# Patient Record
Sex: MALE | Race: BLACK OR AFRICAN AMERICAN | Employment: OTHER | URBAN - METROPOLITAN AREA
[De-identification: names, ages, dates, MRNs, and addresses within clinical notes are randomized per-mention and may not be internally consistent; named-entity substitution may affect disease eponyms.]

---

## 2019-03-08 ENCOUNTER — APPOINTMENT (OUTPATIENT)
Dept: CT IMAGING | Age: 74
DRG: 683 | End: 2019-03-08
Attending: EMERGENCY MEDICINE
Payer: MEDICARE

## 2019-03-08 ENCOUNTER — HOSPITAL ENCOUNTER (INPATIENT)
Age: 74
LOS: 2 days | Discharge: HOME | DRG: 683 | End: 2019-03-10
Attending: EMERGENCY MEDICINE | Admitting: HOSPITALIST
Payer: MEDICARE

## 2019-03-08 ENCOUNTER — APPOINTMENT (OUTPATIENT)
Dept: VASCULAR SURGERY | Age: 74
DRG: 683 | End: 2019-03-08
Attending: EMERGENCY MEDICINE
Payer: MEDICARE

## 2019-03-08 ENCOUNTER — APPOINTMENT (OUTPATIENT)
Dept: GENERAL RADIOLOGY | Age: 74
DRG: 683 | End: 2019-03-08
Attending: EMERGENCY MEDICINE
Payer: MEDICARE

## 2019-03-08 DIAGNOSIS — R33.9 URINARY RETENTION: ICD-10-CM

## 2019-03-08 DIAGNOSIS — R53.1 GENERAL WEAKNESS: ICD-10-CM

## 2019-03-08 DIAGNOSIS — E83.42 HYPOMAGNESEMIA: ICD-10-CM

## 2019-03-08 DIAGNOSIS — I44.7 LBBB (LEFT BUNDLE BRANCH BLOCK): ICD-10-CM

## 2019-03-08 DIAGNOSIS — E87.1 HYPONATREMIA: Primary | ICD-10-CM

## 2019-03-08 DIAGNOSIS — N17.9 ACUTE RENAL FAILURE, UNSPECIFIED ACUTE RENAL FAILURE TYPE (HCC): ICD-10-CM

## 2019-03-08 PROBLEM — E78.5 HYPERLIPIDEMIA: Status: ACTIVE | Noted: 2019-03-08

## 2019-03-08 PROBLEM — I10 HTN (HYPERTENSION): Status: ACTIVE | Noted: 2019-03-08

## 2019-03-08 PROBLEM — G47.33 OSA (OBSTRUCTIVE SLEEP APNEA): Status: ACTIVE | Noted: 2019-03-08

## 2019-03-08 PROBLEM — E66.01 MORBID OBESITY (HCC): Status: ACTIVE | Noted: 2019-03-08

## 2019-03-08 PROBLEM — E11.9 TYPE 2 DIABETES MELLITUS (HCC): Status: ACTIVE | Noted: 2019-03-08

## 2019-03-08 LAB
ALBUMIN SERPL-MCNC: 3.2 G/DL (ref 3.4–5)
ALBUMIN/GLOB SERPL: 0.8 {RATIO} (ref 0.8–1.7)
ALP SERPL-CCNC: 113 U/L (ref 45–117)
ALT SERPL-CCNC: 24 U/L (ref 16–61)
ANION GAP SERPL CALC-SCNC: 11 MMOL/L (ref 3–18)
ANION GAP SERPL CALC-SCNC: 9 MMOL/L (ref 3–18)
APPEARANCE UR: CLEAR
APTT PPP: 33.8 SEC (ref 23–36.4)
AST SERPL-CCNC: 29 U/L (ref 15–37)
BACTERIA URNS QL MICRO: NEGATIVE /HPF
BILIRUB SERPL-MCNC: 0.4 MG/DL (ref 0.2–1)
BILIRUB UR QL: NEGATIVE
BNP SERPL-MCNC: 642 PG/ML (ref 0–900)
BUN SERPL-MCNC: 62 MG/DL (ref 7–18)
BUN SERPL-MCNC: 64 MG/DL (ref 7–18)
BUN/CREAT SERPL: 24 (ref 12–20)
BUN/CREAT SERPL: 28 (ref 12–20)
CALCIUM SERPL-MCNC: 7.7 MG/DL (ref 8.5–10.1)
CALCIUM SERPL-MCNC: 7.7 MG/DL (ref 8.5–10.1)
CHLORIDE SERPL-SCNC: 87 MMOL/L (ref 100–108)
CHLORIDE SERPL-SCNC: 89 MMOL/L (ref 100–108)
CK MB CFR SERPL CALC: 2.1 % (ref 0–4)
CK MB SERPL-MCNC: 10.9 NG/ML (ref 5–25)
CK SERPL-CCNC: 526 U/L (ref 39–308)
CO2 SERPL-SCNC: 20 MMOL/L (ref 21–32)
CO2 SERPL-SCNC: 23 MMOL/L (ref 21–32)
COLOR UR: YELLOW
CREAT SERPL-MCNC: 2.3 MG/DL (ref 0.6–1.3)
CREAT SERPL-MCNC: 2.56 MG/DL (ref 0.6–1.3)
EPITH CASTS URNS QL MICRO: NEGATIVE /LPF (ref 0–5)
ERYTHROCYTE [DISTWIDTH] IN BLOOD BY AUTOMATED COUNT: 13.8 % (ref 11.6–14.5)
GLOBULIN SER CALC-MCNC: 4 G/DL (ref 2–4)
GLUCOSE BLD STRIP.AUTO-MCNC: 67 MG/DL (ref 70–110)
GLUCOSE SERPL-MCNC: 124 MG/DL (ref 74–99)
GLUCOSE SERPL-MCNC: 57 MG/DL (ref 74–99)
GLUCOSE UR STRIP.AUTO-MCNC: NEGATIVE MG/DL
HCT VFR BLD AUTO: 38.8 % (ref 36–48)
HGB BLD-MCNC: 13 G/DL (ref 13–16)
HGB UR QL STRIP: ABNORMAL
INR PPP: 1 (ref 0.8–1.2)
KETONES UR QL STRIP.AUTO: NEGATIVE MG/DL
LEUKOCYTE ESTERASE UR QL STRIP.AUTO: NEGATIVE
MAGNESIUM SERPL-MCNC: 1.3 MG/DL (ref 1.6–2.6)
MCH RBC QN AUTO: 26.6 PG (ref 24–34)
MCHC RBC AUTO-ENTMCNC: 33.5 G/DL (ref 31–37)
MCV RBC AUTO: 79.3 FL (ref 74–97)
NITRITE UR QL STRIP.AUTO: NEGATIVE
PH UR STRIP: 5 [PH] (ref 5–8)
PLATELET # BLD AUTO: 221 K/UL (ref 135–420)
PMV BLD AUTO: 10.4 FL (ref 9.2–11.8)
POTASSIUM SERPL-SCNC: 4.9 MMOL/L (ref 3.5–5.5)
POTASSIUM SERPL-SCNC: 5.3 MMOL/L (ref 3.5–5.5)
PROT SERPL-MCNC: 7.2 G/DL (ref 6.4–8.2)
PROT UR STRIP-MCNC: 100 MG/DL
PROTHROMBIN TIME: 12.9 SEC (ref 11.5–15.2)
PSA SERPL-MCNC: 10.9 NG/ML (ref 0–4)
RBC # BLD AUTO: 4.89 M/UL (ref 4.7–5.5)
RBC #/AREA URNS HPF: NORMAL /HPF (ref 0–5)
SODIUM SERPL-SCNC: 118 MMOL/L (ref 136–145)
SODIUM SERPL-SCNC: 121 MMOL/L (ref 136–145)
SP GR UR REFRACTOMETRY: 1.01 (ref 1–1.03)
TROPONIN I SERPL-MCNC: 0.04 NG/ML (ref 0–0.04)
UROBILINOGEN UR QL STRIP.AUTO: 0.2 EU/DL (ref 0.2–1)
WBC # BLD AUTO: 8.9 K/UL (ref 4.6–13.2)
WBC URNS QL MICRO: NORMAL /HPF (ref 0–4)

## 2019-03-08 PROCEDURE — 82962 GLUCOSE BLOOD TEST: CPT

## 2019-03-08 PROCEDURE — 93971 EXTREMITY STUDY: CPT

## 2019-03-08 PROCEDURE — 83880 ASSAY OF NATRIURETIC PEPTIDE: CPT

## 2019-03-08 PROCEDURE — 87086 URINE CULTURE/COLONY COUNT: CPT

## 2019-03-08 PROCEDURE — 74011250637 HC RX REV CODE- 250/637: Performed by: EMERGENCY MEDICINE

## 2019-03-08 PROCEDURE — 96374 THER/PROPH/DIAG INJ IV PUSH: CPT

## 2019-03-08 PROCEDURE — 51702 INSERT TEMP BLADDER CATH: CPT

## 2019-03-08 PROCEDURE — 83735 ASSAY OF MAGNESIUM: CPT

## 2019-03-08 PROCEDURE — 85730 THROMBOPLASTIN TIME PARTIAL: CPT

## 2019-03-08 PROCEDURE — 93005 ELECTROCARDIOGRAM TRACING: CPT

## 2019-03-08 PROCEDURE — 82550 ASSAY OF CK (CPK): CPT

## 2019-03-08 PROCEDURE — 81001 URINALYSIS AUTO W/SCOPE: CPT

## 2019-03-08 PROCEDURE — 71045 X-RAY EXAM CHEST 1 VIEW: CPT

## 2019-03-08 PROCEDURE — 74011250636 HC RX REV CODE- 250/636: Performed by: EMERGENCY MEDICINE

## 2019-03-08 PROCEDURE — 74011250637 HC RX REV CODE- 250/637: Performed by: HOSPITALIST

## 2019-03-08 PROCEDURE — 99285 EMERGENCY DEPT VISIT HI MDM: CPT

## 2019-03-08 PROCEDURE — 77030005514 HC CATH URETH FOL14 BARD -A

## 2019-03-08 PROCEDURE — 94660 CPAP INITIATION&MGMT: CPT

## 2019-03-08 PROCEDURE — 85027 COMPLETE CBC AUTOMATED: CPT

## 2019-03-08 PROCEDURE — 84153 ASSAY OF PSA TOTAL: CPT

## 2019-03-08 PROCEDURE — 74011250636 HC RX REV CODE- 250/636: Performed by: HOSPITALIST

## 2019-03-08 PROCEDURE — 80053 COMPREHEN METABOLIC PANEL: CPT

## 2019-03-08 PROCEDURE — 85610 PROTHROMBIN TIME: CPT

## 2019-03-08 PROCEDURE — 36415 COLL VENOUS BLD VENIPUNCTURE: CPT

## 2019-03-08 PROCEDURE — 65270000029 HC RM PRIVATE

## 2019-03-08 PROCEDURE — 74176 CT ABD & PELVIS W/O CONTRAST: CPT

## 2019-03-08 RX ORDER — ACETAMINOPHEN 325 MG/1
650 TABLET ORAL
Status: DISCONTINUED | OUTPATIENT
Start: 2019-03-08 | End: 2019-03-09

## 2019-03-08 RX ORDER — OMEPRAZOLE 20 MG/1
20 CAPSULE, DELAYED RELEASE ORAL DAILY
COMMUNITY

## 2019-03-08 RX ORDER — GLUCOSAMINE SULFATE 1500 MG
1000 POWDER IN PACKET (EA) ORAL DAILY
COMMUNITY
End: 2019-04-30

## 2019-03-08 RX ORDER — CALCIPOTRIENE 50 UG/G
CREAM TOPICAL 2 TIMES DAILY
COMMUNITY

## 2019-03-08 RX ORDER — ATORVASTATIN CALCIUM 20 MG/1
10 TABLET, FILM COATED ORAL
Status: DISCONTINUED | OUTPATIENT
Start: 2019-03-09 | End: 2019-03-10 | Stop reason: HOSPADM

## 2019-03-08 RX ORDER — FUROSEMIDE 10 MG/ML
40 INJECTION INTRAMUSCULAR; INTRAVENOUS
Status: COMPLETED | OUTPATIENT
Start: 2019-03-08 | End: 2019-03-08

## 2019-03-08 RX ORDER — DORZOLAMIDE HYDROCHLORIDE AND TIMOLOL MALEATE 20; 5 MG/ML; MG/ML
1 SOLUTION/ DROPS OPHTHALMIC 2 TIMES DAILY
COMMUNITY

## 2019-03-08 RX ORDER — LATANOPROST 50 UG/ML
1 SOLUTION/ DROPS OPHTHALMIC
COMMUNITY
End: 2019-04-30

## 2019-03-08 RX ORDER — NIFEDIPINE 60 MG/1
60 TABLET, EXTENDED RELEASE ORAL DAILY
COMMUNITY

## 2019-03-08 RX ORDER — LISINOPRIL 40 MG/1
40 TABLET ORAL DAILY
COMMUNITY
End: 2019-04-30

## 2019-03-08 RX ORDER — DOCUSATE SODIUM 100 MG/1
100 CAPSULE, LIQUID FILLED ORAL
COMMUNITY

## 2019-03-08 RX ORDER — LANOLIN ALCOHOL/MO/W.PET/CERES
420 CREAM (GRAM) TOPICAL DAILY
COMMUNITY

## 2019-03-08 RX ORDER — ATORVASTATIN CALCIUM 20 MG/1
20 TABLET, FILM COATED ORAL
Status: DISCONTINUED | OUTPATIENT
Start: 2019-03-08 | End: 2019-03-08

## 2019-03-08 RX ORDER — ATORVASTATIN CALCIUM 20 MG/1
20 TABLET, FILM COATED ORAL
COMMUNITY
End: 2019-04-30

## 2019-03-08 RX ORDER — GLYBURIDE 5 MG/1
5 TABLET ORAL
COMMUNITY

## 2019-03-08 RX ORDER — ASPIRIN 325 MG
325 TABLET ORAL DAILY
COMMUNITY
End: 2019-04-30

## 2019-03-08 RX ORDER — DOCUSATE SODIUM 100 MG/1
100 CAPSULE, LIQUID FILLED ORAL
Status: DISCONTINUED | OUTPATIENT
Start: 2019-03-08 | End: 2019-03-10 | Stop reason: HOSPADM

## 2019-03-08 RX ORDER — COLCHICINE 0.6 MG/1
0.6 TABLET ORAL AS NEEDED
COMMUNITY
End: 2019-04-30

## 2019-03-08 RX ORDER — ENOXAPARIN SODIUM 100 MG/ML
40 INJECTION SUBCUTANEOUS EVERY 24 HOURS
Status: DISCONTINUED | OUTPATIENT
Start: 2019-03-08 | End: 2019-03-09

## 2019-03-08 RX ORDER — MAGNESIUM SULFATE HEPTAHYDRATE 40 MG/ML
2 INJECTION, SOLUTION INTRAVENOUS
Status: COMPLETED | OUTPATIENT
Start: 2019-03-08 | End: 2019-03-09

## 2019-03-08 RX ORDER — CHLORTHALIDONE 25 MG/1
25 TABLET ORAL DAILY
COMMUNITY

## 2019-03-08 RX ADMIN — MAGNESIUM SULFATE HEPTAHYDRATE 2 G: 40 INJECTION, SOLUTION INTRAVENOUS at 17:14

## 2019-03-08 RX ADMIN — ENOXAPARIN SODIUM 40 MG: 40 INJECTION SUBCUTANEOUS at 18:35

## 2019-03-08 RX ADMIN — FUROSEMIDE 40 MG: 10 INJECTION, SOLUTION INTRAMUSCULAR; INTRAVENOUS at 13:47

## 2019-03-08 RX ADMIN — NITROGLYCERIN 1 INCH: 20 OINTMENT TOPICAL at 13:47

## 2019-03-08 NOTE — ED NOTES
TRANSFER - ED to INPATIENT REPORT:    SBAR report made available to receiving floor on this patient being transferred to [facility] (2100)  for routine progression of care       Admitting diagnosis Urinary retention [R33.9]  Acute renal failure (ARF) ([facility] [address] [N17.9]    Information from the following report(s) SBAR, ED Summary and MAR was made available to receiving floor.     Lines:   Peripheral IV 03/08/19 Right Antecubital (Active)   Site Assessment Clean, dry, & intact 3/8/2019  1:21 PM   Phlebitis Assessment 0 3/8/2019  1:21 PM   Infiltration Assessment 0 3/8/2019  1:21 PM   Dressing Status Clean, dry, & intact 3/8/2019  1:21 PM   Dressing Type Transparent 3/8/2019  1:21 PM   Hub Color/Line Status Green;Flushed 3/8/2019  1:21 PM   Action Taken Blood drawn 3/8/2019  1:21 PM          Patient is oriented to time, place, person and situation   Patient is  continent and      Valuables transported with patient     Patient transported with:       MAP (Monitor): 74 =Monitored (most recent)  Vitals w/ MEWS Score (last day)     Date/Time MEWS Score Pulse Resp Temp BP Level of Consciousness SpO2    03/08/19 1645  2  80  21  98.2 °F (36.8 °C)  131/58  Alert  93 %    03/08/19 1530  --  75  --  --  102/53  --  95 %    03/08/19 1515  --  77  --  --  129/44  --  94 %    03/08/19 1500  --  76  --  --  130/61  --  95 %    03/08/19 1445  --  76  --  --  130/67  --  94 %    03/08/19 1430  --  67  --  --  132/56  --  95 %    03/08/19 1415  --  69  --  --  --  --  --    03/08/19 1400  --  68  --  --  124/71  --  --    03/08/19 1347  --  66  --  --  123/60  --  --    03/08/19 1345  --  66  --  --  --  --  96 %    03/08/19 1330  --  64  --  --  --  --  96 %    03/08/19 1315  --  61  --  --  --  --  97 %    03/08/19 1300  --  62  --  --  123/60  --  95 %    03/08/19 12:51:38  0  58  (Abnormal)   14  98.4 °F (36.9 °C)  148/58  Alert  98 %    03/08/19 1243  --  --  18  98.7 °F (37.1 °C)  --  Alert  --

## 2019-03-08 NOTE — ED TRIAGE NOTES
Patient brought to the ED via EMS, abd pain x 3 days with blood in stool, denies N/V/D, states he has had difficulty urinating and bilateral leg swelling, denies CHF

## 2019-03-08 NOTE — ED PROVIDER NOTES
EMERGENCY DEPARTMENT HISTORY AND PHYSICAL EXAM    1:13 PM      Date: 3/8/2019  Patient Name: Alicia Paris    History of Presenting Illness     Chief Complaint   Patient presents with    Leg Swelling    Abdominal Pain    Melena       History Provided By: Patient      Additional History (Context): Alicia Paris is a 76 y.o. male with diabetes who presents with worsening leg swelling for the past 3 days. Denies NVD. Normally goes to [place]. No recent travel, hx of DVT, or recent surgeries. He says this leg swelling is never this bad. Reports for the last week that he cannot stand due to feet swelling. Reports wheezing, sob,  difficulty urinating, and blood in stool. gen weak  No cp or sob  + unable to urinate today        PCP: None    Chief Complaint: leg swelling  Duration: 3  Days  Timing:  Constant  Location: left leg  Severity: 6 out of 10  Associated Symptoms:, difficulty urinating, and blood in stool. Past History     Past Medical History:  DM    Past Surgical History:  No past surgical history on file. Family History:  No family history on file. Social History:  Social History     Tobacco Use    Smoking status: Not on file   Substance Use Topics    Alcohol use: Not on file    Drug use: Not on file       Allergies:  No Known Allergies      Review of Systems       Review of Systems   Constitutional: Negative for activity change, fatigue and fever. HENT: Negative for congestion and rhinorrhea. Eyes: Negative for visual disturbance. Respiratory: Positive for shortness of breath and wheezing. Cardiovascular: Positive for leg swelling. Negative for chest pain and palpitations. Gastrointestinal: Positive for abdominal pain. Negative for diarrhea, nausea and vomiting. Genitourinary: Positive for difficulty urinating. Negative for dysuria and hematuria. Positive for Melena   Musculoskeletal: Negative for back pain. Skin: Negative for rash.    Neurological: Negative for dizziness, weakness and light-headedness. Psychiatric/Behavioral: Negative for agitation. All other systems reviewed and are negative. Physical Exam     Visit Vitals  /53   Pulse 75   Temp 98.4 °F (36.9 °C)   Resp 14   Ht 6' (1.829 m)   Wt 145.2 kg (320 lb)   SpO2 95%   BMI 43.40 kg/m²         Physical Exam   Constitutional: He appears well-developed and well-nourished. HENT:   Head: Normocephalic and atraumatic. Eyes: Conjunctivae are normal. Pupils are equal, round, and reactive to light. Neck: Normal range of motion. Neck supple. JVD: unable to eval for jvd. Cardiovascular: Normal rate and regular rhythm. No calf tenderness  dp pulse present   Pulmonary/Chest: Effort normal and breath sounds normal.   Abdominal: Soft. Bowel sounds are normal. There is no tenderness. Abd full but nontender   Genitourinary:   Genitourinary Comments: Rectal exam: chaperoned by Juliet Kilgore (med student)  Bright red blood per rectum that is internal. No hemorrhoids   Musculoskeletal: Normal range of motion. He exhibits edema. Left lower extremity more edema than right  No cellulitis or open wounds   Lymphadenopathy:     He has no cervical adenopathy. Neurological: He is alert. Skin: Skin is warm. Psychiatric: He has a normal mood and affect.          Diagnostic Study Results     Labs -  Recent Results (from the past 12 hour(s))   GLUCOSE, POC    Collection Time: 03/08/19 12:59 PM   Result Value Ref Range    Glucose (POC) 67 (L) 70 - 110 mg/dL   CARDIAC PANEL,(CK, CKMB & TROPONIN)    Collection Time: 03/08/19  1:15 PM   Result Value Ref Range     (H) 39 - 308 U/L    CK - MB 10.9 (H) <3.6 ng/ml    CK-MB Index 2.1 0.0 - 4.0 %    Troponin-I, QT 0.04 0.0 - 0.045 NG/ML   CBC W/O DIFF    Collection Time: 03/08/19  1:15 PM   Result Value Ref Range    WBC 8.9 4.6 - 13.2 K/uL    RBC 4.89 4.70 - 5.50 M/uL    HGB 13.0 13.0 - 16.0 g/dL    HCT 38.8 36.0 - 48.0 %    MCV 79.3 74.0 - 97.0 FL    MCH 26.6 24.0 - 34.0 PG    MCHC 33.5 31.0 - 37.0 g/dL    RDW 13.8 11.6 - 14.5 %    PLATELET 927 757 - 175 K/uL    MPV 10.4 9.2 - 70.0 FL   METABOLIC PANEL, COMPREHENSIVE    Collection Time: 03/08/19  1:15 PM   Result Value Ref Range    Sodium 118 (LL) 136 - 145 mmol/L    Potassium 5.3 3.5 - 5.5 mmol/L    Chloride 87 (L) 100 - 108 mmol/L    CO2 20 (L) 21 - 32 mmol/L    Anion gap 11 3.0 - 18 mmol/L    Glucose 57 (L) 74 - 99 mg/dL    BUN 62 (H) 7.0 - 18 MG/DL    Creatinine 2.56 (H) 0.6 - 1.3 MG/DL    BUN/Creatinine ratio 24 (H) 12 - 20      GFR est AA 30 (L) >60 ml/min/1.73m2    GFR est non-AA 25 (L) >60 ml/min/1.73m2    Calcium 7.7 (L) 8.5 - 10.1 MG/DL    Bilirubin, total 0.4 0.2 - 1.0 MG/DL    ALT (SGPT) 24 16 - 61 U/L    AST (SGOT) 29 15 - 37 U/L    Alk.  phosphatase 113 45 - 117 U/L    Protein, total 7.2 6.4 - 8.2 g/dL    Albumin 3.2 (L) 3.4 - 5.0 g/dL    Globulin 4.0 2.0 - 4.0 g/dL    A-G Ratio 0.8 0.8 - 1.7     NT-PRO BNP    Collection Time: 03/08/19  1:15 PM   Result Value Ref Range    NT pro- 0 - 900 PG/ML   PROTHROMBIN TIME + INR    Collection Time: 03/08/19  1:15 PM   Result Value Ref Range    Prothrombin time 12.9 11.5 - 15.2 sec    INR 1.0 0.8 - 1.2     PTT    Collection Time: 03/08/19  1:15 PM   Result Value Ref Range    aPTT 33.8 23.0 - 36.4 SEC   MAGNESIUM    Collection Time: 03/08/19  1:15 PM   Result Value Ref Range    Magnesium 1.3 (L) 1.6 - 2.6 mg/dL   EKG, 12 LEAD, INITIAL    Collection Time: 03/08/19  1:30 PM   Result Value Ref Range    Ventricular Rate 62 BPM    Atrial Rate 62 BPM    P-R Interval 168 ms    QRS Duration 148 ms    Q-T Interval 450 ms    QTC Calculation (Bezet) 456 ms    Calculated P Axis 32 degrees    Calculated R Axis 13 degrees    Calculated T Axis -158 degrees    Diagnosis       Normal sinus rhythm  Left bundle branch block  Abnormal ECG  No previous ECGs available     URINALYSIS W/ RFLX MICROSCOPIC    Collection Time: 03/08/19  3:11 PM   Result Value Ref Range    Color YELLOW      Appearance CLEAR Specific gravity 1.013 1.005 - 1.030      pH (UA) 5.0 5.0 - 8.0      Protein 100 (A) NEG mg/dL    Glucose NEGATIVE  NEG mg/dL    Ketone NEGATIVE  NEG mg/dL    Bilirubin NEGATIVE  NEG      Blood SMALL (A) NEG      Urobilinogen 0.2 0.2 - 1.0 EU/dL    Nitrites NEGATIVE  NEG      Leukocyte Esterase NEGATIVE  NEG         Radiologic Studies -   CT ABD PELV WO CONT   Final Result   IMPRESSION:      1. Normal caliber small and large bowel. No evidence of bowel obstruction or   free intraperitoneal gas. Colonic diverticulosis without findings of   diverticulitis      2. Mild bilateral perinephric stranding; nonspecific. No hydronephrosis or   urolithiasis. Urinary bladder decompressed with a Zhang catheter. 3. Inferior vena cava filter in situ, below the level of the renal veins. .         XR CHEST SNGL V   Final Result   IMPRESSION:      No acute pulmonary process identified. Prominent cardiac silhouette noted. Medical Decision Making     It should be noted that IKayy MD will be the provider of record for this patient. I reviewed the vital signs, available nursing notes, past medical history, past surgical history, family history and social history. Vital Signs-Reviewed the patient's vital signs. Pulse Oximetry Analysis -  95% on room air     EKG: Interpreted by the EP. Time Interpreted: 13:30   Rate: 62   Rhythm: Sinus Rhythm    Interpretation: normal intervals except  ms. There is a LBBB present. Records Reviewed: Nursing Notes (Time of Review: 1:13 PM)    Provider Notes (Medical Decision Making):  Patient presents to emergency department with rectal bleeding, hemorrhoid on physical exam no abdominal tenderness. The bleeding is right bright red blood. No black or bloody stools appreciated no masses in the abdominal exam no pulsatile mass.   Patient also has bilateral lower extremity edema left greater than right patient has increased edema and states he is unable to stand on his feet. Denies any shortness of breath or chest pain. Denies any fever chills vomiting diarrhea or cough. Patient also states he is unable to urinate and feels like he has to urinate but I cannot all day today. Patient does not have any significant medical history that can be appreciated from the prior chart as patient is a patient at [facility] and not much information is available patient does have left bundle branch block but unable to confirm this is her new from the EKGs. Patient does have medical history of diabetes patient's white ulcer today associated with blood pressure 148/58 there is no hypoxia or tachypnea appreciated. Hyponatremia is appreciated renal insufficiency is appreciated  Possible secondary to urinary retention  I will check urine for UTI as well  A Zhang catheter is placed as patient is unable to urinate in the emergency Department that is been present for 1 day. I will admit patient start antibiotics based on patient's urinalysis  Arrival slowly hydrate patient with sodium and evaluate patient periodically for changes in patient's sodium level. CXR shows no PNA. IVC filter on CT. Perinephric standing present. Urine pending. ED Course: Progress Notes, Reevaluation, and Consults: All findings discussed with patient at 1700  No complaints   Abdominal  Pressure is resolved  No complaints  No cp or sob  Zhang bag present due to unable to urinate  No rash        Critical Care Time:  The services I provided to this patient were to treat and/or prevent clinically significant deterioration that could result in the failure of one or more body systems and/or organ systems due to electrolyte insufficieny.     Services included the following:  -reviewing nursing notes and old charts  -vital sign assessments  -direct patient care  -medication orders and management  -interpreting and reviewing diagnostic studies/labs  -re-evaluations  -documentation time    Aggregate critical care time was 30 minutes, which includes only time during which I was engaged in work directly related to the patient's care as described above, whether I was at bedside or elsewhere in the Emergency Department. It did not include time spent performing other reported procedures or the services of residents, students, nurses, or advance practice providers. Rhina Castro MD    4:42 PM      For Hospitalized Patients:    1. Hospitalization Decision Time:  The decision to hospitalize the patient was made by Dr. Marquise Espinal at 1700 on 3/8/2019        Diagnosis     Clinical Impression:   1. Hyponatremia    2. General weakness    3. Acute renal failure, unspecified acute renal failure type ([facility] [address]    4. Urinary retention    5. Hypomagnesemia    6. LBBB (left bundle branch block)        Disposition: home    Follow-up Information    None             Medication List      You have not been prescribed any medications. _______________________________       Scribe Castro Casanova acting as a scribe for and in the presence of Rhina Castro MD      March 08, 2019 at 1:52 PM       Provider Attestation:      I personally performed the services described in the documentation, reviewed the documentation, as recorded by the scribe in my presence, and it accurately and completely records my words and actions.  March 08, 2019 at 1:52 PM - Rhina Castro MD        _______________________________

## 2019-03-08 NOTE — PROGRESS NOTES
Pt arrived to the floor from ED via stretcher to room accompanied by ED nurse. Pt is A&Ox4, denies pain. No distress noted. Pt oriented to room. Call bell and frequently used items in reach with bed locked in lowest position. Pt stated he uses a CiPap machine at night. MD made aware.

## 2019-03-08 NOTE — ED NOTES
Pt transported to 2103 for admission. Bedside report given to Harry CLEANING with all questions answered. Magnesium sulfate infusion continued on admission. Pt in no distress at time of admission.

## 2019-03-09 PROBLEM — E87.1 HYPONATREMIA: Status: ACTIVE | Noted: 2019-03-09

## 2019-03-09 LAB
ANION GAP SERPL CALC-SCNC: 9 MMOL/L (ref 3–18)
BASOPHILS # BLD: 0 K/UL (ref 0–0.1)
BASOPHILS NFR BLD: 0 % (ref 0–2)
BUN SERPL-MCNC: 64 MG/DL (ref 7–18)
BUN/CREAT SERPL: 31 (ref 12–20)
CALCIUM SERPL-MCNC: 7.7 MG/DL (ref 8.5–10.1)
CHLORIDE SERPL-SCNC: 91 MMOL/L (ref 100–108)
CO2 SERPL-SCNC: 22 MMOL/L (ref 21–32)
CREAT SERPL-MCNC: 2.08 MG/DL (ref 0.6–1.3)
DIFFERENTIAL METHOD BLD: ABNORMAL
EOSINOPHIL # BLD: 0.4 K/UL (ref 0–0.4)
EOSINOPHIL NFR BLD: 6 % (ref 0–5)
ERYTHROCYTE [DISTWIDTH] IN BLOOD BY AUTOMATED COUNT: 13.7 % (ref 11.6–14.5)
EST. AVERAGE GLUCOSE BLD GHB EST-MCNC: 123 MG/DL
GLUCOSE BLD STRIP.AUTO-MCNC: 122 MG/DL (ref 70–110)
GLUCOSE BLD STRIP.AUTO-MCNC: 129 MG/DL (ref 70–110)
GLUCOSE BLD STRIP.AUTO-MCNC: 133 MG/DL (ref 70–110)
GLUCOSE BLD STRIP.AUTO-MCNC: 143 MG/DL (ref 70–110)
GLUCOSE BLD STRIP.AUTO-MCNC: 83 MG/DL (ref 70–110)
GLUCOSE SERPL-MCNC: 83 MG/DL (ref 74–99)
HBA1C MFR BLD: 5.9 % (ref 4.2–5.6)
HCT VFR BLD AUTO: 35.1 % (ref 36–48)
HCT VFR BLD AUTO: 35.8 % (ref 36–48)
HGB BLD-MCNC: 11.6 G/DL (ref 13–16)
HGB BLD-MCNC: 11.7 G/DL (ref 13–16)
LYMPHOCYTES # BLD: 1.5 K/UL (ref 0.9–3.6)
LYMPHOCYTES NFR BLD: 23 % (ref 21–52)
MCH RBC QN AUTO: 25.8 PG (ref 24–34)
MCHC RBC AUTO-ENTMCNC: 32.7 G/DL (ref 31–37)
MCV RBC AUTO: 78.9 FL (ref 74–97)
MONOCYTES # BLD: 0.8 K/UL (ref 0.05–1.2)
MONOCYTES NFR BLD: 12 % (ref 3–10)
NEUTS SEG # BLD: 3.9 K/UL (ref 1.8–8)
NEUTS SEG NFR BLD: 59 % (ref 40–73)
PLATELET # BLD AUTO: 176 K/UL (ref 135–420)
PMV BLD AUTO: 10.2 FL (ref 9.2–11.8)
POTASSIUM SERPL-SCNC: 5 MMOL/L (ref 3.5–5.5)
RBC # BLD AUTO: 4.54 M/UL (ref 4.7–5.5)
SODIUM SERPL-SCNC: 122 MMOL/L (ref 136–145)
WBC # BLD AUTO: 6.6 K/UL (ref 4.6–13.2)

## 2019-03-09 PROCEDURE — 94660 CPAP INITIATION&MGMT: CPT

## 2019-03-09 PROCEDURE — 74011000250 HC RX REV CODE- 250: Performed by: HOSPITALIST

## 2019-03-09 PROCEDURE — 83036 HEMOGLOBIN GLYCOSYLATED A1C: CPT

## 2019-03-09 PROCEDURE — 74011250637 HC RX REV CODE- 250/637: Performed by: HOSPITALIST

## 2019-03-09 PROCEDURE — 36415 COLL VENOUS BLD VENIPUNCTURE: CPT

## 2019-03-09 PROCEDURE — 74011250636 HC RX REV CODE- 250/636: Performed by: PHYSICIAN ASSISTANT

## 2019-03-09 PROCEDURE — 85025 COMPLETE CBC W/AUTO DIFF WBC: CPT

## 2019-03-09 PROCEDURE — 85018 HEMOGLOBIN: CPT

## 2019-03-09 PROCEDURE — 77030020186 HC BOOT HL PROTCT SAGE -B

## 2019-03-09 PROCEDURE — 80048 BASIC METABOLIC PNL TOTAL CA: CPT

## 2019-03-09 PROCEDURE — 65270000029 HC RM PRIVATE

## 2019-03-09 PROCEDURE — 74011250637 HC RX REV CODE- 250/637: Performed by: PHYSICIAN ASSISTANT

## 2019-03-09 RX ORDER — LATANOPROST 50 UG/ML
1 SOLUTION/ DROPS OPHTHALMIC
Status: DISCONTINUED | OUTPATIENT
Start: 2019-03-09 | End: 2019-03-10 | Stop reason: HOSPADM

## 2019-03-09 RX ORDER — ALLOPURINOL 100 MG/1
100 TABLET ORAL DAILY
Status: DISCONTINUED | OUTPATIENT
Start: 2019-03-09 | End: 2019-03-10 | Stop reason: HOSPADM

## 2019-03-09 RX ORDER — INSULIN LISPRO 100 [IU]/ML
INJECTION, SOLUTION INTRAVENOUS; SUBCUTANEOUS
Status: DISCONTINUED | OUTPATIENT
Start: 2019-03-09 | End: 2019-03-09

## 2019-03-09 RX ORDER — ACETAMINOPHEN 325 MG/1
975 TABLET ORAL 3 TIMES DAILY
Status: DISCONTINUED | OUTPATIENT
Start: 2019-03-09 | End: 2019-03-10 | Stop reason: HOSPADM

## 2019-03-09 RX ORDER — ACETAMINOPHEN 325 MG/1
975 TABLET ORAL 3 TIMES DAILY
COMMUNITY

## 2019-03-09 RX ORDER — PANTOPRAZOLE SODIUM 40 MG/1
40 TABLET, DELAYED RELEASE ORAL
Status: DISCONTINUED | OUTPATIENT
Start: 2019-03-09 | End: 2019-03-10 | Stop reason: HOSPADM

## 2019-03-09 RX ORDER — INSULIN LISPRO 100 [IU]/ML
INJECTION, SOLUTION INTRAVENOUS; SUBCUTANEOUS
Status: DISCONTINUED | OUTPATIENT
Start: 2019-03-09 | End: 2019-03-10 | Stop reason: HOSPADM

## 2019-03-09 RX ORDER — NIFEDIPINE 30 MG/1
60 TABLET, EXTENDED RELEASE ORAL DAILY
Status: DISCONTINUED | OUTPATIENT
Start: 2019-03-10 | End: 2019-03-10 | Stop reason: HOSPADM

## 2019-03-09 RX ORDER — DORZOLAMIDE HYDROCHLORIDE AND TIMOLOL MALEATE 20; 5 MG/ML; MG/ML
1 SOLUTION/ DROPS OPHTHALMIC 2 TIMES DAILY
Status: DISCONTINUED | OUTPATIENT
Start: 2019-03-09 | End: 2019-03-10 | Stop reason: HOSPADM

## 2019-03-09 RX ORDER — ASPIRIN 325 MG
325 TABLET ORAL DAILY
Status: DISCONTINUED | OUTPATIENT
Start: 2019-03-09 | End: 2019-03-10 | Stop reason: HOSPADM

## 2019-03-09 RX ORDER — ALLOPURINOL 100 MG/1
100 TABLET ORAL DAILY
COMMUNITY
End: 2019-04-30

## 2019-03-09 RX ORDER — SODIUM CHLORIDE 9 MG/ML
100 INJECTION, SOLUTION INTRAVENOUS CONTINUOUS
Status: DISCONTINUED | OUTPATIENT
Start: 2019-03-09 | End: 2019-03-10 | Stop reason: HOSPADM

## 2019-03-09 RX ORDER — TAMSULOSIN HYDROCHLORIDE 0.4 MG/1
0.4 CAPSULE ORAL DAILY
Status: DISCONTINUED | OUTPATIENT
Start: 2019-03-09 | End: 2019-03-10 | Stop reason: HOSPADM

## 2019-03-09 RX ORDER — DEXTROSE MONOHYDRATE 25 G/50ML
25-50 INJECTION, SOLUTION INTRAVENOUS AS NEEDED
Status: DISCONTINUED | OUTPATIENT
Start: 2019-03-09 | End: 2019-03-10 | Stop reason: HOSPADM

## 2019-03-09 RX ORDER — MAGNESIUM SULFATE 100 %
4 CRYSTALS MISCELLANEOUS AS NEEDED
Status: DISCONTINUED | OUTPATIENT
Start: 2019-03-09 | End: 2019-03-10 | Stop reason: HOSPADM

## 2019-03-09 RX ADMIN — ACETAMINOPHEN 650 MG: 325 TABLET, FILM COATED ORAL at 08:24

## 2019-03-09 RX ADMIN — DORZOLAMIDE HYDROCHLORIDE AND TIMOLOL MALEATE 1 DROP: 20; 5 SOLUTION OPHTHALMIC at 21:49

## 2019-03-09 RX ADMIN — ATORVASTATIN CALCIUM 10 MG: 20 TABLET, FILM COATED ORAL at 21:41

## 2019-03-09 RX ADMIN — ACETAMINOPHEN 975 MG: 325 TABLET, FILM COATED ORAL at 13:09

## 2019-03-09 RX ADMIN — ALLOPURINOL 100 MG: 100 TABLET ORAL at 08:17

## 2019-03-09 RX ADMIN — SODIUM CHLORIDE 100 ML/HR: 900 INJECTION, SOLUTION INTRAVENOUS at 21:56

## 2019-03-09 RX ADMIN — ASPIRIN 325 MG ORAL TABLET 325 MG: 325 PILL ORAL at 08:17

## 2019-03-09 RX ADMIN — DOCUSATE SODIUM 100 MG: 100 CAPSULE, LIQUID FILLED ORAL at 01:01

## 2019-03-09 RX ADMIN — DOCUSATE SODIUM 100 MG: 100 CAPSULE, LIQUID FILLED ORAL at 04:12

## 2019-03-09 RX ADMIN — SODIUM CHLORIDE 100 ML/HR: 900 INJECTION, SOLUTION INTRAVENOUS at 09:35

## 2019-03-09 RX ADMIN — DORZOLAMIDE HYDROCHLORIDE AND TIMOLOL MALEATE 1 DROP: 20; 5 SOLUTION OPHTHALMIC at 11:06

## 2019-03-09 RX ADMIN — PANTOPRAZOLE SODIUM 40 MG: 40 TABLET, DELAYED RELEASE ORAL at 07:53

## 2019-03-09 RX ADMIN — LATANOPROST 1 DROP: 50 SOLUTION OPHTHALMIC at 21:54

## 2019-03-09 RX ADMIN — ACETAMINOPHEN 975 MG: 325 TABLET, FILM COATED ORAL at 17:12

## 2019-03-09 RX ADMIN — TAMSULOSIN HYDROCHLORIDE 0.4 MG: 0.4 CAPSULE ORAL at 13:10

## 2019-03-09 NOTE — ROUTINE PROCESS
Bedside and Verbal shift change report given to [address] [place] [address]  (oncoming nurse) by Oralia Julien RN   (offgoing nurse). Report included the following information SBAR, Kardex, MAR and Recent Results.

## 2019-03-09 NOTE — PROGRESS NOTES
-1055-Pt. Placed Cpap per MD, [id number] secondary to LUANA. Pt. States he uses his machine every night. O2 sats-94% HR-79, RR-20. No respiratory distress noted. -[address]

## 2019-03-09 NOTE — PROGRESS NOTES
0730: Bedside and Verbal shift change report given to Jamey Berkowitz RN (oncoming nurse) by Liliana Hopkins RN (offgoing nurse). Report included the following information SBAR, Kardex and MAR.

## 2019-03-09 NOTE — H&P
History and Physical    Patient: Miky Mejias               Sex: male          DOA: 3/8/2019       YOB: 1945      Age:  76 y.o.        LOS:  LOS: 0 days        HPI:     Miky Mejias is a 76 y.o. male who presented to the ER with abdominal pain. He reports that he has had increasing difficulty with urination. At first if he drank a lot of fluids he would ultimately be able to urinate, but then he was no longer able to urinate at all. He did not have dysuria or hematuria. He has not had a problem with his prostate that he is aware of. He had mohamud catheter placed in the ER and 2 liters of urine was reduced. He reports he had swelling in his abdomen and feet before coming to the ER. He will be admitted for ongoing management. Past Medical History   NIDDM   HTN   Hyperlipidemia   LUANA   Morbid obesity    Social History:   Tobacco use:  Patient does not use tobacco   Alcohol use:  Patient does not use alcohol   Occupation:  Retired [employer]    Family History:   Both parents reportedly healthy    Review of Systems    Constitutional:  No fever or weight loss  HEENT:  No headache or visual changes  Cardiovascular:  No chest pain or diaphoresis  Respiratory:  No coughing, wheezing, or shortness of breath. GI:  No nausea or vomitting. No diarrhea, swelling in abdomen as above  :  No hematuria or dysuria  Skin:  No rashes or moles  Neuro:  No seizures or syncope  Extremities:  Peripheral edema as above  Hematological:  No bruising or bleeding  Endocrine:  Patient has known diabetes, no thyroid disease    Physical Exam:      Visit Vitals  /58   Pulse 80   Temp 98.2 °F (36.8 °C)   Resp 21   Ht 6' (1.829 m)   Wt 145.2 kg (320 lb)   SpO2 93%   BMI 43.40 kg/m²       Physical Exam:    Gen:  No distress, alert  HEENT:  Normal cephalic atraumatic, extra-occular movements are intact.   Neck:  Supple, No JVD  Lungs:  Clear bilaterally, no wheeze, no rales, normal effort  Heart:  Regular Rate and Rhythm, normal S1 and S2, no edema  Abdomen:  Soft, non tender, normal bowel sounds, no guarding. Extremities:  Well perfused, no cyanosis or edema  Neurological:  Awake and alert, CN's are intact, normal strength throughout extremities  Skin:  No rashes or moles  Mental Status:  Normal thought process, does not appear anxious  Laboratory Studies:    BMP:   Lab Results   Component Value Date/Time     (LL) 03/08/2019 01:15 PM    K 5.3 03/08/2019 01:15 PM    CL 87 (L) 03/08/2019 01:15 PM    CO2 20 (L) 03/08/2019 01:15 PM    AGAP 11 03/08/2019 01:15 PM    GLU 57 (L) 03/08/2019 01:15 PM    BUN 62 (H) 03/08/2019 01:15 PM    CREA 2.56 (H) 03/08/2019 01:15 PM    GFRAA 30 (L) 03/08/2019 01:15 PM    GFRNA 25 (L) 03/08/2019 01:15 PM     CBC:   Lab Results   Component Value Date/Time    WBC 8.9 03/08/2019 01:15 PM    HGB 13.0 03/08/2019 01:15 PM    HCT 38.8 03/08/2019 01:15 PM     03/08/2019 01:15 PM       Assessment/Plan     Principal Problem:    Urinary retention (3/8/2019)    Active Problems:    Acute renal failure (ARF) ([facility] [address] (3/8/2019)      Type 2 diabetes mellitus ([facility] [address] (3/8/2019)      HTN (hypertension) (3/8/2019)      Hyperlipidemia (3/8/2019)      LUANA (obstructive sleep apnea) (3/8/2019)      Morbid obesity ([facility] [address] (3/8/2019)        PLAN:    Continue mohamud catheter  Urine in Mohamud is blood tinged, potentially from traumatic mohamud insertion, possible enlarged prostate  Patient will need Urology consult in AM  Follow renal function  BS control  BP control  DVT prophylaxis.

## 2019-03-09 NOTE — PROGRESS NOTES
1920: Assumed patient care. Received report from [address] (offgoing nurse). Report included SBAR, Kardex, and MAR. Patient resting in bed, in no signs of pain or distress. Call light and possessions within reach. Bed in lowest setting.   -  patient states he is on CPAP at night and absolutely needs it at night; is unaware of settings     1944: Dr. Lana Metz paged regarding above  1948: Dr. Flory Page in room     2131: POC blood glucose test not transferred into EMR; result on accucheck 129; confirmed by CSN     2155: patient requesting CPAP as well as night time medications- tylenol three times a day, avorstatin, and doculace as night time medications; requesting to take medications in room from PTA medications.  Stated this is not appropriate as the doctor needs to authorize these medcations; patient agitated    - 2157: paged dr. Doris Granda     [phone]: spoke to dr. Deloris Monterroso; stated he will place orders  [id number]: 20 mg avorstatin ordred; patient states he takes 10 mg; confirmed on his at home medication pill bottle; dr. Alonso Whyte paged    6652: Spoke with Dr. Deloris Monterroso informed him of above as well as tylenol     0342 [id number]: Pharmacy stated Lipitor has a start date of  3/9 2100 due to elevated CK when asked if medication can be delivered this evening  2300: explained to patient tylenol has been ordered PRN (explained -as needed) and to please alert nursing staff if in pain; additonally informed patient lipitor (avorstatin as patient knows) will be held tonight due to abnormal lab work; patient verbalized understanding     0100: Patient states it is his regimen at home to take colace once at 0100 and then again at 0400    0430: blood at catheter site; catheter care provided    9838: Patient highly agitated that at home medicines have not been ordered- stated no one has gone over medications; this nurse went through at home medications in bag confirming the PTA med list review done by nurse as previous shift was correct  - patient again requesting to take medication in at home bottles; this nurse stated that is not appropriate as the medical staff needs to be aware of waht medications patient is taking and when and doctor must approve these medications; patient continues to be agitated    0658: Spoke with Dr. Mnany Humphries doctor stated he will place orders for appropriate medications; stated he will be holding blood pressure medications due to patient's renal lab values - stated authorization to relay this to patient  21 : Spoke with patient; stated doctor ordered medications aspirin, protonix, and eye drops; explained blood pressure medications were held due labwork relating to kidney; patient verbalized understanding

## 2019-03-09 NOTE — PROGRESS NOTES
Pt continues to rest on cpap of 12 at this time. No respiratory distress at this time. spo2 96% HR 75 RR 19.

## 2019-03-09 NOTE — ROUTINE PROCESS
1520 Bedside and Verbal shift change report given to [facility]  (oncoming nurse) by Milka Cedillo (offgoing nurse). Report included the following information SBAR, Kardex, Intake/Output, MAR, Accordion and Recent Results. 1525 Pt resting in bed with no complaints of pain. Will continue to monitor. 1820 Pt resting in bed with no complaints of pain and no change to condition. Will continue to monitor. 1900 Inventoried pt's home medications. Appropriate documentation placed in chart. Bedside and Verbal shift change report given to Isauro Henriquez (oncoming nurse) by Mary Jo Waters (offgoing nurse). Report included the following information SBAR, Kardex, Intake/Output, MAR and Recent Results.

## 2019-03-09 NOTE — PROGRESS NOTES
Bedside and Verbal shift change report given to Alaina Daily RN (oncoming nurse) by Arthur Simmons RN (offgoing nurse). Report included the following information SBAR, Kardex, Intake/Output, MAR and Recent Results.

## 2019-03-09 NOTE — PROGRESS NOTES
Patient received in bed awake. Patient alert and oriented X4, denies pain and discomfort. Patient resting quietly. Frequent use items within reach. Bed locked in low position. Call bell within reach and patient verbalized understanding of use for assistance and needs. Dual skin assessment conducted with Rachel Tucker

## 2019-03-09 NOTE — PROGRESS NOTES
Problem: Pressure Injury - Risk of  Goal: *Prevention of pressure injury  Document Andrew Scale and appropriate interventions in the flowsheet. Outcome: Progressing Towards Goal  Pressure Injury Interventions:  Sensory Interventions: Assess changes in LOC    Moisture Interventions: Absorbent underpads, Internal/External urinary devices    Activity Interventions: Pressure redistribution bed/mattress(bed type)    Mobility Interventions: HOB 30 degrees or less    Nutrition Interventions: Document food/fluid/supplement intake                    Problem: Falls - Risk of  Goal: *Absence of Falls  Document Mc Fall Risk and appropriate interventions in the flowsheet.   Outcome: Progressing Towards Goal  Fall Risk Interventions:  Mobility Interventions: Communicate number of staff needed for ambulation/transfer         Medication Interventions: Patient to call before getting OOB    Elimination Interventions: Patient to call for help with toileting needs

## 2019-03-09 NOTE — PROGRESS NOTES
Reason for Admission:   Urinary retention, Acuter renal failure                  RRAT Score:    14              Do you (patient/family) have any concerns for transition/discharge? Plan for utilizing home health:  As indicated       Likelihood of readmission?   mod            Transition of Care Plan:   Home  Interviewed patient, he agrees to share his discharge information with his spouse, Shabbir Moreno . Demographic informaiton verified. He was independent with the use of a walker prior to admission and sees doctors at the [place] for his primary care needs. He wears home CPAP as HS. His discharge plan is to return home. Care Management Interventions  PCP Verified by CM: Yes  Palliative Care Criteria Met (RRAT>21 & CHF Dx)?: No  Mode of Transport at Discharge:  Other (see comment)  Transition of Care Consult (CM Consult): Discharge Planning  MyChart Signup: No  Discharge Durable Medical Equipment: No  Health Maintenance Reviewed: Yes  Physical Therapy Consult: No  Occupational Therapy Consult: No  Speech Therapy Consult: No  Current Support Network: Lives with Spouse  Confirm Follow Up Transport: Family  Plan discussed with Pt/Family/Caregiver: Yes  [place] Resource Information Provided?: No  Discharge Location  Discharge Placement: Home

## 2019-03-09 NOTE — PROGRESS NOTES
Internal Medicine Progress Note    Patient's Name: Hazel Taylor  Admit Date: 3/8/2019  Length of Stay: 1      Assessment/Plan     Principal Problem:    Urinary retention (3/8/2019)    Active Problems:    Acute renal failure (ARF) ([facility] (3/8/2019)      Hyponatremia (3/9/2019)      Type 2 diabetes mellitus ([facility] (3/8/2019)      HTN (hypertension) (3/8/2019)      Hyperlipidemia (3/8/2019)      LUANA (obstructive sleep apnea) (3/8/2019)      Morbid obesity ([facility] (3/8/2019)      Urinary retention  - mohamud placed in ED  - urology consult - appreciate assistance   - start flomax    ARF  - creatinine improving  - IVF    Hyponatremia  - NS  - improving    T2DM  - SSI  - monitor BG    HTN/HLD  - home meds    - Cont acceptable home medications for chronic conditions   - DVT protocol    I have personally reviewed all pertinent labs and films that have officially resulted over the last 24 hours. I have personally checked for all pending labs that are awaiting final results. Interval History     Per H&P, Tereza Garcia is a 76 y.o. male who presented to the ER with abdominal pain. He reports that he has had increasing difficulty with urination. At first if he drank a lot of fluids he would ultimately be able to urinate, but then he was no longer able to urinate at all. He did not have dysuria or hematuria. He has not had a problem with his prostate that he is aware of. He had mohamud catheter placed in the ER and 2 liters of urine was reduced. He reports he had swelling in his abdomen and feet before coming to the ER. He will be admitted for ongoing management. \" Patient's creatinine was elevated on admission, but has since improved. He states his \"kidney numbers are elevated\" but is not sure what they are at baseline. Patient also presented with hyponatremia which is improving as well. Abd CT results below. No hydronephrosis or urolithiasis.  Urology consulted, spoke with Dr. Jeison Patel who recommends continuing mohamud for 2 weeks, start flomax now, and follow up in office in 2 weeks. Subjective     Pt s/e @ bedside. No major events overnight. Pt offers no complaints this AM. Denies CP or SOB. Denies abd pain, nvd. Objective     Visit Vitals  /71   Pulse 75   Temp 98.4 °F (36.9 °C)   Resp 16   Ht 6' (1.829 m)   Wt 145.2 kg (320 lb)   SpO2 94%   BMI 43.40 kg/m²       Physical Exam:  General Appearance: NAD, conversant  HENT: normocephalic/atraumatic, moist mucus membranes  Neck: No JVD, supple  Lungs: CTA with normal respiratory effort  CV: RRR, no m/r/g  Abdomen: soft, non-tender, normal bowel sounds  Extremities: no cyanosis, no peripheral edema  Neuro: No focal deficits, motor/sensory intact  Skin: Normal color, intact      Intake and Output:  Current Shift:  03/09 0701 - 03/09 1900  In: 120 [P.O.:120]  Out: 1200 [Urine:1200]  Last three shifts:  03/07 1901 - 03/09 0700  In: -   Out: 4850 [Urine:4850]    Lab/Data Reviewed:  BMP:   Lab Results   Component Value Date/Time     (L) 03/09/2019 04:15 AM    K 5.0 03/09/2019 04:15 AM    CL 91 (L) 03/09/2019 04:15 AM    CO2 22 03/09/2019 04:15 AM    AGAP 9 03/09/2019 04:15 AM    GLU 83 03/09/2019 04:15 AM    BUN 64 (H) 03/09/2019 04:15 AM    CREA 2.08 (H) 03/09/2019 04:15 AM    GFRAA 38 (L) 03/09/2019 04:15 AM    GFRNA 31 (L) 03/09/2019 04:15 AM     CBC:   Lab Results   Component Value Date/Time    WBC 6.6 03/09/2019 04:15 AM    HGB 11.7 (L) 03/09/2019 04:15 AM    HCT 35.8 (L) 03/09/2019 04:15 AM     03/09/2019 04:15 AM       Imaging Reviewed:  Xr Chest Sngl V    Result Date: 3/8/2019  EXAM: One-view chest CLINICAL HISTORY: periheral edema , COMPARISON: None FINDINGS: Frontal view of the chest demonstrate low lung volumes but no focal airspace disease or acute process identified. . Cardiac silhouette is enlarged in size and contour. No acute bony or soft tissue abnormality. IMPRESSION: No acute pulmonary process identified.  Prominent cardiac silhouette noted.    Ct Abd Pelv Wo Cont    Result Date: 3/8/2019  EXAM: CT of the abdomen and pelvis INDICATION: Abdominal pain COMPARISON: None. TECHNIQUE: Axial CT imaging of the abdomen and pelvis was performed without intravenous contrast. Multiplanar reformats were generated. One or more dose reduction techniques were used on this CT: automated exposure control, adjustment of the mAs and/or kVp according to patient size, and iterative reconstruction techniques. The specific techniques used on this CT exam have been documented in the patient's electronic medical record. Digital Imaging and Communications in Medicine (DICOM) format image data are available to nonaffiliated external healthcare facilities or entities on a secure, media free, reciprocally searchable basis with patient authorization for at least a 12-month period after this study. FINDINGS: LOWER CHEST: Minor atelectasis noted dependently at the lung bases. No pleural effusion. Normal cardiac size. LIVER, BILIARY: Unenhanced appearance of the liver demonstrates no concerning focal abnormality. No biliary dilation. Gallbladder is unremarkable. PANCREAS: Normal unenhanced appearance. SPLEEN: Normal. ADRENALS: Normal. KIDNEYS/URETERS/BLADDER: No hydronephrosis. Multiple bilateral renal cysts noted. Mild bilateral perinephric stranding. Urinary bladder decompressed with a Zhang catheter. Minor, nonspecific perinephric retroperitoneal fat stranding noted. PELVIC ORGANS: Unremarkable. VASCULATURE: Diffuse aortobiiliac atherosclerotic calcification is present without evidence of aneurysmal dilatation. Inferior vena cava filter noted below the level of the renal veins. LYMPH NODES: No enlarged lymph nodes. GASTROINTESTINAL TRACT: Small hiatal hernia. Normal caliber small and large bowel. No bowel obstruction. No free intraperitoneal gas. Multiple colonic diverticula without findings of diverticulitis. Appendix.  BONES: No acute or aggressive osseous abnormalities identified. Multilevel lower thoracic and lumbar spondylosis with prominent Schmorl's node in the superior endplate of L4. There are severity bilateral hip joint osteoarthritis. OTHER: Small fat-containing umbilical hernia. _______________     IMPRESSION: 1. Normal caliber small and large bowel. No evidence of bowel obstruction or free intraperitoneal gas. Colonic diverticulosis without findings of diverticulitis 2. Mild bilateral perinephric stranding; nonspecific. No hydronephrosis or urolithiasis. Urinary bladder decompressed with a Zhang catheter. 3. Inferior vena cava filter in situ, below the level of the renal veins. .       Medications Reviewed:  Current Facility-Administered Medications   Medication Dose Route Frequency    allopurinol (ZYLOPRIM) tablet 100 mg  100 mg Oral DAILY    aspirin tablet 325 mg  325 mg Oral DAILY    dorzolamide-timolol (COSOPT) 22.3-6.8 mg/mL ophthalmic solution 1 Drop  1 Drop Both Eyes BID    latanoprost (XALATAN) 0.005 % ophthalmic solution 1 Drop  1 Drop Both Eyes QHS    pantoprazole (PROTONIX) tablet 40 mg  40 mg Oral ACB    glucose chewable tablet 16 g  4 Tab Oral PRN    glucagon (GLUCAGEN) injection 1 mg  1 mg IntraMUSCular PRN    dextrose (D50) infusion 12.5-25 g  25-50 mL IntraVENous PRN    0.9% sodium chloride infusion  100 mL/hr IntraVENous CONTINUOUS    acetaminophen (TYLENOL) tablet 975 mg  975 mg Oral TID    insulin lispro (HUMALOG) injection   SubCUTAneous AC&HS    tamsulosin (FLOMAX) capsule 0.4 mg  0.4 mg Oral DAILY    enoxaparin (LOVENOX) injection 40 mg  40 mg SubCUTAneous Q24H    docusate sodium (COLACE) capsule 100 mg  100 mg Oral BID PRN    atorvastatin (LIPITOR) tablet 10 mg  10 mg Oral QHS         Luz Shields PA-C  2 [facility]  Hospitalist Division  Office:  [phone]  Pager: [phone]

## 2019-03-09 NOTE — PROGRESS NOTES
Problem: Pressure Injury - Risk of  Goal: *Prevention of pressure injury  Document Andrew Scale and appropriate interventions in the flowsheet. Outcome: Progressing Towards Goal  Pressure Injury Interventions:  Sensory Interventions: Assess changes in LOC    Moisture Interventions: Absorbent underpads    Activity Interventions: Pressure redistribution bed/mattress(bed type)    Mobility Interventions: HOB 30 degrees or less    Nutrition Interventions: Document food/fluid/supplement intake                    Problem: Falls - Risk of  Goal: *Absence of Falls  Document Mc Fall Risk and appropriate interventions in the flowsheet.   Outcome: Progressing Towards Goal  Fall Risk Interventions:            Medication Interventions: Teach patient to arise slowly

## 2019-03-10 ENCOUNTER — HOME HEALTH ADMISSION (OUTPATIENT)
Dept: HOME HEALTH SERVICES | Facility: HOME HEALTH | Age: 74
End: 2019-03-10

## 2019-03-10 VITALS
HEIGHT: 72 IN | SYSTOLIC BLOOD PRESSURE: 173 MMHG | DIASTOLIC BLOOD PRESSURE: 75 MMHG | WEIGHT: 315 LBS | RESPIRATION RATE: 16 BRPM | HEART RATE: 87 BPM | BODY MASS INDEX: 42.66 KG/M2 | OXYGEN SATURATION: 98 % | TEMPERATURE: 97.6 F

## 2019-03-10 LAB
ANION GAP SERPL CALC-SCNC: 6 MMOL/L (ref 3–18)
ATRIAL RATE: 62 BPM
BACTERIA SPEC CULT: NORMAL
BASOPHILS # BLD: 0 K/UL (ref 0–0.1)
BASOPHILS NFR BLD: 0 % (ref 0–2)
BUN SERPL-MCNC: 57 MG/DL (ref 7–18)
BUN/CREAT SERPL: 33 (ref 12–20)
CALCIUM SERPL-MCNC: 8.1 MG/DL (ref 8.5–10.1)
CALCULATED P AXIS, ECG09: 32 DEGREES
CALCULATED R AXIS, ECG10: 13 DEGREES
CALCULATED T AXIS, ECG11: -158 DEGREES
CHLORIDE SERPL-SCNC: 97 MMOL/L (ref 100–108)
CO2 SERPL-SCNC: 25 MMOL/L (ref 21–32)
CREAT SERPL-MCNC: 1.75 MG/DL (ref 0.6–1.3)
DIAGNOSIS, 93000: NORMAL
DIFFERENTIAL METHOD BLD: ABNORMAL
EOSINOPHIL # BLD: 0.4 K/UL (ref 0–0.4)
EOSINOPHIL NFR BLD: 6 % (ref 0–5)
ERYTHROCYTE [DISTWIDTH] IN BLOOD BY AUTOMATED COUNT: 13.6 % (ref 11.6–14.5)
GLUCOSE BLD STRIP.AUTO-MCNC: 113 MG/DL (ref 70–110)
GLUCOSE BLD STRIP.AUTO-MCNC: 114 MG/DL (ref 70–110)
GLUCOSE SERPL-MCNC: 103 MG/DL (ref 74–99)
HCT VFR BLD AUTO: 34.7 % (ref 36–48)
HGB BLD-MCNC: 11.6 G/DL (ref 13–16)
LYMPHOCYTES # BLD: 1.4 K/UL (ref 0.9–3.6)
LYMPHOCYTES NFR BLD: 22 % (ref 21–52)
MCH RBC QN AUTO: 26.4 PG (ref 24–34)
MCHC RBC AUTO-ENTMCNC: 33.4 G/DL (ref 31–37)
MCV RBC AUTO: 78.9 FL (ref 74–97)
MONOCYTES # BLD: 0.9 K/UL (ref 0.05–1.2)
MONOCYTES NFR BLD: 15 % (ref 3–10)
NEUTS SEG # BLD: 3.6 K/UL (ref 1.8–8)
NEUTS SEG NFR BLD: 57 % (ref 40–73)
P-R INTERVAL, ECG05: 168 MS
PLATELET # BLD AUTO: 208 K/UL (ref 135–420)
PMV BLD AUTO: 10.7 FL (ref 9.2–11.8)
POTASSIUM SERPL-SCNC: 5.4 MMOL/L (ref 3.5–5.5)
Q-T INTERVAL, ECG07: 450 MS
QRS DURATION, ECG06: 148 MS
QTC CALCULATION (BEZET), ECG08: 456 MS
RBC # BLD AUTO: 4.4 M/UL (ref 4.7–5.5)
SERVICE CMNT-IMP: NORMAL
SODIUM SERPL-SCNC: 128 MMOL/L (ref 136–145)
VENTRICULAR RATE, ECG03: 62 BPM
WBC # BLD AUTO: 6.3 K/UL (ref 4.6–13.2)

## 2019-03-10 PROCEDURE — 85025 COMPLETE CBC W/AUTO DIFF WBC: CPT

## 2019-03-10 PROCEDURE — 80048 BASIC METABOLIC PNL TOTAL CA: CPT

## 2019-03-10 PROCEDURE — 74011250637 HC RX REV CODE- 250/637: Performed by: PHYSICIAN ASSISTANT

## 2019-03-10 PROCEDURE — 82962 GLUCOSE BLOOD TEST: CPT

## 2019-03-10 PROCEDURE — 74011250637 HC RX REV CODE- 250/637: Performed by: HOSPITALIST

## 2019-03-10 PROCEDURE — 36415 COLL VENOUS BLD VENIPUNCTURE: CPT

## 2019-03-10 RX ORDER — TAMSULOSIN HYDROCHLORIDE 0.4 MG/1
0.4 CAPSULE ORAL DAILY
Qty: 30 CAP | Refills: 0 | Status: SHIPPED | OUTPATIENT
Start: 2019-03-11 | End: 2019-04-03 | Stop reason: SDUPTHER

## 2019-03-10 RX ADMIN — DOCUSATE SODIUM 100 MG: 100 CAPSULE, LIQUID FILLED ORAL at 06:41

## 2019-03-10 RX ADMIN — ASPIRIN 325 MG ORAL TABLET 325 MG: 325 PILL ORAL at 08:40

## 2019-03-10 RX ADMIN — NIFEDIPINE 60 MG: 30 TABLET, FILM COATED, EXTENDED RELEASE ORAL at 08:40

## 2019-03-10 RX ADMIN — TAMSULOSIN HYDROCHLORIDE 0.4 MG: 0.4 CAPSULE ORAL at 08:40

## 2019-03-10 RX ADMIN — ALLOPURINOL 100 MG: 100 TABLET ORAL at 08:40

## 2019-03-10 RX ADMIN — DORZOLAMIDE HYDROCHLORIDE AND TIMOLOL MALEATE 1 DROP: 20; 5 SOLUTION OPHTHALMIC at 12:08

## 2019-03-10 RX ADMIN — PANTOPRAZOLE SODIUM 40 MG: 40 TABLET, DELAYED RELEASE ORAL at 08:39

## 2019-03-10 RX ADMIN — ACETAMINOPHEN 975 MG: 325 TABLET, FILM COATED ORAL at 12:08

## 2019-03-10 RX ADMIN — ACETAMINOPHEN 975 MG: 325 TABLET, FILM COATED ORAL at 06:41

## 2019-03-10 NOTE — DISCHARGE INSTRUCTIONS
Patient Education        Urinary Retention: Care Instructions  Your Care Instructions    Urinary retention means that you aren't able to urinate. In men, it is often caused by a blockage of the urinary tract from an enlarged prostate gland. In men and women, it can also be caused by an infection or nerve damage. Or it may be a side effect of a medicine. The doctor may have drained the urine from your bladder. If you still have problems passing urine, you may need to use a catheter at home. This is used to empty your bladder until the problem can be fixed. Your doctor may put a catheter in your bladder before you go home. If so, he or she will tell you when to come back to have the catheter removed. The doctor has checked you closely. But problems can develop later. If you notice any problems or new symptoms, get medical treatment right away. Follow-up care is a key part of your treatment and safety. Be sure to make and go to all appointments, and call your doctor if you are having problems. It's also a good idea to know your test results and keep a list of the medicines you take. How can you care for yourself at home? · Take your medicines exactly as prescribed. Call your doctor if you think you are having a problem with your medicine. You will get more details on the specific medicines your doctor prescribes. · Check with your doctor before you use any over-the-counter medicines. Many cold and allergy medicines, for example, can make this problem worse. Make sure your doctor knows all of the medicines, vitamins, supplements, and herbal remedies you take. · Spread out through the day the amount of fluid you drink. Do not drink a lot at bedtime. · Avoid alcohol and caffeine. · If you have been given a catheter, or if one is already in place, follow the instructions you were given. Always wash your hands before and after you handle the catheter. When should you call for help?   Call your doctor now or seek immediate medical care if:    · You cannot urinate at all, or it is getting harder to urinate.     · You have symptoms of a urinary tract infection. These may include:  ? Pain or burning when you urinate. ? A frequent need to urinate without being able to pass much urine. ? Pain in the flank, which is just below the rib cage and above the waist on either side of the back. ? Blood in your urine. ? A fever.    Watch closely for changes in your health, and be sure to contact your doctor if:    · You have any problems with your catheter.     · You do not get better as expected. Where can you learn more? Go to http://adrián-mark.info/. Enter M244 in the search box to learn more about \"Urinary Retention: Care Instructions. \"  Current as of: March 20, 2018  Content Version: 11.9  © 9625-2614 [employer]. Care instructions adapted under license by [employer] (which disclaims liability or warranty for this information). If you have questions about a medical condition or this instruction, always ask your healthcare professional. [place] [address] any warranty or liability for your use of this information. DISCHARGE SUMMARY from Nurse    PATIENT INSTRUCTIONS:    After general anesthesia or intravenous sedation, for 24 hours or while taking prescription Narcotics:  · Limit your activities  · Do not drive and operate hazardous machinery  · Do not make important personal or business decisions  · Do  not drink alcoholic beverages  · If you have not urinated within 8 hours after discharge, please contact your surgeon on call.     Report the following to your surgeon:  · Excessive pain, swelling, redness or odor of or around the surgical area  · Temperature over 100.5  · Nausea and vomiting lasting longer than 4 hours or if unable to take medications  · Any signs of decreased circulation or nerve impairment to extremity: change in color, persistent numbness, tingling, coldness or increase pain  · Any questions    What to do at Home:  Recommended activity: Activity as tolerated. If you experience any of the following symptoms in the \"when to call for help\" section of the urinary care instructions, please follow up with your primary care provider and/or call 911. *  Please give a list of your current medications to your Primary Care Provider. *  Please update this list whenever your medications are discontinued, doses are      changed, or new medications (including over-the-counter products) are added. *  Please carry medication information at all times in case of emergency situations. These are general instructions for a healthy lifestyle:    No smoking/ No tobacco products/ Avoid exposure to second hand smoke  Surgeon General's Warning:  Quitting smoking now greatly reduces serious risk to your health. Obesity, smoking, and sedentary lifestyle greatly increases your risk for illness    A healthy diet, regular physical exercise & weight monitoring are important for maintaining a healthy lifestyle    You may be retaining fluid if you have a history of heart failure or if you experience any of the following symptoms:  Weight gain of 3 pounds or more overnight or 5 pounds in a week, increased swelling in our hands or feet or shortness of breath while lying flat in bed. Please call your doctor as soon as you notice any of these symptoms; do not wait until your next office visit. Recognize signs and symptoms of STROKE:    F-face looks uneven    A-arms unable to move or move unevenly    S-speech slurred or non-existent    T-time-call 911 as soon as signs and symptoms begin-DO NOT go       Back to bed or wait to see if you get better-TIME IS BRAIN. Warning Signs of HEART ATTACK     Call 911 if you have these symptoms:   Chest discomfort.  Most heart attacks involve discomfort in the center of the chest that lasts more than a few minutes, or that goes away and comes back. It can feel like uncomfortable pressure, squeezing, fullness, or pain.  Discomfort in other areas of the upper body. Symptoms can include pain or discomfort in one or both arms, the back, neck, jaw, or stomach.  Shortness of breath with or without chest discomfort.  Other signs may include breaking out in a cold sweat, nausea, or lightheadedness. Don't wait more than five minutes to call 911 - MINUTES MATTER! Fast action can save your life. Calling 911 is almost always the fastest way to get lifesaving treatment. Emergency Medical Services staff can begin treatment when they arrive -- up to an hour sooner than if someone gets to the hospital by car. The discharge information has been reviewed with the patient. The patient verbalized understanding. Discharge medications reviewed with the patient and appropriate educational materials and side effects teaching were provided.   ___________________________________________________________________________________________________________________________________    Patient armband removed and shredded

## 2019-03-10 NOTE — PROGRESS NOTES
Problem: Pressure Injury - Risk of  Goal: *Prevention of pressure injury  Document Andrew Scale and appropriate interventions in the flowsheet. Outcome: Progressing Towards Goal  Pressure Injury Interventions:  Sensory Interventions: Assess changes in LOC    Moisture Interventions: Absorbent underpads    Activity Interventions: Pressure redistribution bed/mattress(bed type)    Mobility Interventions: HOB 30 degrees or less    Nutrition Interventions: Document food/fluid/supplement intake                    Problem: Falls - Risk of  Goal: *Absence of Falls  Document Mc Fall Risk and appropriate interventions in the flowsheet.   Outcome: Progressing Towards Goal  Fall Risk Interventions:  Mobility Interventions: Communicate number of staff needed for ambulation/transfer         Medication Interventions: Patient to call before getting OOB    Elimination Interventions: Patient to call for help with toileting needs

## 2019-03-10 NOTE — PROGRESS NOTES
Patient received in bed awake. Patient alert and oriented X4, denies pain and discomfort. Patient resting quietly. Frequent use items within reach. Bed locked in low position. Call bell within reach and patient verbalized understanding of use for assistance and needs. Dual skin assessment conducted with Sister Amrita Barry RN. 1430:  Patient received discharge instructions and medication prescription. Explained all information in the discharge instructions and medications. Patient and wife given education and shown how to properly care for a mohamud catheter, leg bag, and regular mohamud bag. Emphasized the importance of hand hygiene and cleaning the mohamud site to prevent infection. Four bags of medications were returned to the patient. 1515:  Patient was discharged from the hospital via wheelchair and transported home via taxi cab.

## 2019-03-10 NOTE — PROGRESS NOTES
To discharge home with [facility].  to secure a PCP for this patient. Care Management Interventions  PCP Verified by CM: Yes  Palliative Care Criteria Met (RRAT>21 & CHF Dx)?: No  Mode of Transport at Discharge: Other (see comment)(cab)  Transition of Care Consult (CM Consult):  Other(H2H)  MyChart Signup: No  Discharge Durable Medical Equipment: No  Health Maintenance Reviewed: Yes  Physical Therapy Consult: No  Occupational Therapy Consult: No  Speech Therapy Consult: No  Current Support Network: Lives with Spouse  Confirm Follow Up Transport: Cab  Plan discussed with Pt/Family/Caregiver: Yes   Resource Information Provided?: No  Discharge Location  Discharge Placement: Other:(H2H)

## 2019-03-10 NOTE — PROGRESS NOTES
Bedside report received from [place]. Pt in bed resting alert and oriented x 4  denies pain at the moment. Assessement completed plan of care for the shift explained pt verbalized understanding. IVF infusing well, mohamud catheter in place draining well. HOB elevated, bed low and in locked position. Call light and frequently used items within reach. 2142- XX Large non skid socks and prevalon boots applied to bilateral feet. Pt states that his feet feels much better. 2220- Pt wanted CPAP on so that he can go to sleep. Called RT Corin Jimenez)  will be coming soon to place him on CPAP.    2223Cynthea Climes RT on unit pt placed on CPAP.    0645- Pt had concerns about colace scheduled as needed. Pt wants to take med at night. Explanation given MD will be notified to schedule med twice a day . Pt verbalized understanding. Bedside and Verbal shift change report given to Farhan Blevins RN (oncoming nurse) by Rupesh Huntley RN (offgoing nurse). Report included the following information SBAR, Kardex, Intake/Output, MAR and Recent Results.

## 2019-03-10 NOTE — DISCHARGE SUMMARY
[facility] [facility]  Hospitalist Division    Discharge Summary    Patient: Dana Cordon MRN: [id number]  CSN: [id number]    YOB: 1945  Age: 76 y.o. Sex: male    DOA: 3/8/2019 LOS:  LOS: 2 days   Discharge Date:      Admission Diagnoses: Urinary retention [R33.9]  Acute renal failure (ARF) ([facility] [N17.9]    Discharge Diagnoses:    Problem List as of 3/10/2019 Never Reviewed          Codes Class Noted - Resolved    * (Principal) Urinary retention ICD-10-CM: R33.9  ICD-9-CM: 788.20  3/8/2019 - Present        Acute renal failure (ARF) ([facility] ICD-10-CM: N17.9  ICD-9-CM: 584.9  3/8/2019 - Present        Hyponatremia ICD-10-CM: E87.1  ICD-9-CM: 276.1  3/9/2019 - Present        Type 2 diabetes mellitus ([facility] ICD-10-CM: E11.9  ICD-9-CM: 250.00  3/8/2019 - Present        HTN (hypertension) ICD-10-CM: I10  ICD-9-CM: 401.9  3/8/2019 - Present        Hyperlipidemia ICD-10-CM: E78.5  ICD-9-CM: 272.4  3/8/2019 - Present        LUANA (obstructive sleep apnea) ICD-10-CM: G47.33  ICD-9-CM: 327.23  3/8/2019 - Present        Morbid obesity ([facility] ICD-10-CM: E66.01  ICD-9-CM: 278.01  3/8/2019 - Present              Discharge Condition: Stable    Discharge To: Home    Consults: Urology    Hospital Course: Per H&P, \"Micha Chavez is a 76 y. o. male who presented to the ER with abdominal pain.  He reports that he has had increasing difficulty with urination.  At first if he drank a lot of fluids he would ultimately be able to urinate, but then he was no longer able to urinate at all. Lorraine Goldstein did not have dysuria or hematuria. Lorraine Goldstein has not had a problem with his prostate that he is aware of. Lorraine Goldstein had mohamud catheter placed in the ER and 2 liters of urine was reduced. Lorraine Goldstein reports he had swelling in his abdomen and feet before coming to the ER. Lorraine Goldstein will be admitted for ongoing management. \" Patient's creatinine was elevated on admission, but has since improved.  He states his \"kidney numbers are elevated\" but is not sure what they are at baseline. Patient also presented with hyponatremia which is improving as well. Abd CT results below. No hydronephrosis or urolithiasis. Urology consulted, spoke with Dr. Collette Shine who recommends continuing mohamud for 2 weeks, start flomax now, and follow up in office in 2 weeks. Renal indices and sodium continue to improve. VSS at discharge. Physical Exam:  General appearance: alert, cooperative, no distress, appears stated age  Head: Normocephalic, without obvious abnormality, atraumatic  Lungs: clear to auscultation bilaterally  Heart: regular rate and rhythm, S1, S2 normal, no murmur, click, rub or gallop  Abdomen: soft, non-tender. Bowel sounds normal. No masses,  no organomegaly  Extremities: extremities normal, atraumatic, no cyanosis or edema  Skin: Skin color, texture, turgor normal. No rashes or lesions  Neurologic: Grossly normal  PSY: anxious    Significant Diagnostic Studies:     BMP:   Lab Results   Component Value Date/Time     (L) 03/10/2019 04:55 AM    K 5.4 03/10/2019 04:55 AM    CL 97 (L) 03/10/2019 04:55 AM    CO2 25 03/10/2019 04:55 AM    AGAP 6 03/10/2019 04:55 AM     (H) 03/10/2019 04:55 AM    BUN 57 (H) 03/10/2019 04:55 AM    CREA 1.75 (H) 03/10/2019 04:55 AM    GFRAA 46 (L) 03/10/2019 04:55 AM    GFRNA 38 (L) 03/10/2019 04:55 AM     CBC:   Lab Results   Component Value Date/Time    WBC 6.3 03/10/2019 04:55 AM    HGB 11.6 (L) 03/10/2019 04:55 AM    HCT 34.7 (L) 03/10/2019 04:55 AM     03/10/2019 04:55 AM       Ct Abd Pelv Wo Cont     Result Date: 3/8/2019  EXAM: CT of the abdomen and pelvis INDICATION: Abdominal pain COMPARISON: None. TECHNIQUE: Axial CT imaging of the abdomen and pelvis was performed without intravenous contrast. Multiplanar reformats were generated.  One or more dose reduction techniques were used on this CT: automated exposure control, adjustment of the mAs and/or kVp according to patient size, and iterative reconstruction techniques. The specific techniques used on this CT exam have been documented in the patient's electronic medical record. Digital Imaging and Communications in Medicine (DICOM) format image data are available to nonaffiliated external healthcare facilities or entities on a secure, media free, reciprocally searchable basis with patient authorization for at least a 12-month period after this study. FINDINGS: LOWER CHEST: Minor atelectasis noted dependently at the lung bases. No pleural effusion. Normal cardiac size. LIVER, BILIARY: Unenhanced appearance of the liver demonstrates no concerning focal abnormality. No biliary dilation. Gallbladder is unremarkable. PANCREAS: Normal unenhanced appearance. SPLEEN: Normal. ADRENALS: Normal. KIDNEYS/URETERS/BLADDER: No hydronephrosis. Multiple bilateral renal cysts noted. Mild bilateral perinephric stranding. Urinary bladder decompressed with a Zhang catheter. Minor, nonspecific perinephric retroperitoneal fat stranding noted. PELVIC ORGANS: Unremarkable. VASCULATURE: Diffuse aortobiiliac atherosclerotic calcification is present without evidence of aneurysmal dilatation. Inferior vena cava filter noted below the level of the renal veins. LYMPH NODES: No enlarged lymph nodes. GASTROINTESTINAL TRACT: Small hiatal hernia. Normal caliber small and large bowel. No bowel obstruction. No free intraperitoneal gas. Multiple colonic diverticula without findings of diverticulitis. Appendix. BONES: No acute or aggressive osseous abnormalities identified. Multilevel lower thoracic and lumbar spondylosis with prominent Schmorl's node in the superior endplate of L4. There are severity bilateral hip joint osteoarthritis. OTHER: Small fat-containing umbilical hernia. _______________      IMPRESSION: 1. Normal caliber small and large bowel. No evidence of bowel obstruction or free intraperitoneal gas. Colonic diverticulosis without findings of diverticulitis 2.  Mild bilateral perinephric stranding; nonspecific. No hydronephrosis or urolithiasis. Urinary bladder decompressed with a Mohamud catheter. 3. Inferior vena cava filter in situ, below the level of the renal veins. .           Discharge Medications:     Current Discharge Medication List      START taking these medications    Details   tamsulosin (FLOMAX) 0.4 mg capsule Take 1 Cap by mouth daily. Qty: 30 Cap, Refills: 0         CONTINUE these medications which have NOT CHANGED    Details   allopurinol (ZYLOPRIM) 100 mg tablet Take 100 mg by mouth daily. acetaminophen (TYLENOL) 325 mg tablet Take 975 mg by mouth three (3) times daily. aspirin (ASPIRIN) 325 mg tablet Take 325 mg by mouth daily. atorvastatin (LIPITOR) 20 mg tablet Take 20 mg by mouth nightly. calcipotriene (DOVONEX) 0.005 % topical cream Apply  to affected area two (2) times a day. chlorthalidone (HYGROTEN) 25 mg tablet Take 25 mg by mouth daily. cholecalciferol (VITAMIN D3) 1,000 unit cap Take 1,000 Units by mouth daily. 2 tabs      colchicine 0.6 mg tablet Take 0.6 mg by mouth as needed. docusate sodium (COLACE) 100 mg capsule Take 100 mg by mouth two (2) times daily as needed for Constipation. dorzolamide-timolol (COSOPT) 22.3-6.8 mg/mL ophthalmic solution Administer 1 Drop to both eyes two (2) times a day. latanoprost (XALATAN) 0.005 % ophthalmic solution Administer 1 Drop to both eyes nightly. lisinopril (PRINIVIL, ZESTRIL) 40 mg tablet Take 40 mg by mouth daily. magnesium oxide (MAG-OX) 400 mg tablet Take 420 mg by mouth daily. NIFEdipine ER (ADALAT CC) 60 mg ER tablet Take 60 mg by mouth daily. omeprazole (PRILOSEC) 20 mg capsule Take 20 mg by mouth daily. glyBURIDE (DIABETA) 5 mg tablet Take 5 mg by mouth Daily (before breakfast). Activity: Activity as tolerated    Diet: Diabetic Diet    Mohamud Care:  Instruction on mohamud care provided to patient     Follow-up: 1 week with PCP ( consulted for PCP appt); 2 weeks with urology - patient to call [phone] for appt    Discharge time: >35 minutes    Aliyah Betlre PA-C  [facility] [address]  Office:  [phone]  Pager: [phone]      3/10/2019, 11:06 AM

## 2019-03-11 ENCOUNTER — HOME CARE VISIT (OUTPATIENT)
Dept: SCHEDULING | Facility: HOME HEALTH | Age: 74
End: 2019-03-11

## 2019-03-11 PROCEDURE — G0495 RN CARE TRAIN/EDU IN HH: HCPCS

## 2019-03-21 ENCOUNTER — HOSPITAL ENCOUNTER (EMERGENCY)
Age: 74
Discharge: HOME | End: 2019-03-22
Attending: EMERGENCY MEDICINE
Payer: MEDICARE

## 2019-03-21 DIAGNOSIS — N39.0 ACUTE UTI: ICD-10-CM

## 2019-03-21 DIAGNOSIS — Z46.6 URINARY CATHETER (FOLEY) CHANGE REQUIRED: Primary | ICD-10-CM

## 2019-03-21 LAB
APPEARANCE UR: ABNORMAL
BACTERIA URNS QL MICRO: ABNORMAL /HPF
BILIRUB UR QL: NEGATIVE
COLOR UR: ABNORMAL
EPITH CASTS URNS QL MICRO: ABNORMAL /LPF (ref 0–5)
GLUCOSE UR STRIP.AUTO-MCNC: NEGATIVE MG/DL
HGB UR QL STRIP: ABNORMAL
KETONES UR QL STRIP.AUTO: NEGATIVE MG/DL
LEUKOCYTE ESTERASE UR QL STRIP.AUTO: ABNORMAL
NITRITE UR QL STRIP.AUTO: NEGATIVE
PH UR STRIP: 5.5 [PH] (ref 5–8)
PROT UR STRIP-MCNC: 100 MG/DL
RBC #/AREA URNS HPF: ABNORMAL /HPF (ref 0–5)
SP GR UR REFRACTOMETRY: 1.01 (ref 1–1.03)
UROBILINOGEN UR QL STRIP.AUTO: 0.2 EU/DL (ref 0.2–1)
WBC URNS QL MICRO: ABNORMAL /HPF (ref 0–4)

## 2019-03-21 PROCEDURE — 51702 INSERT TEMP BLADDER CATH: CPT

## 2019-03-21 PROCEDURE — 87086 URINE CULTURE/COLONY COUNT: CPT

## 2019-03-21 PROCEDURE — 74011250637 HC RX REV CODE- 250/637: Performed by: EMERGENCY MEDICINE

## 2019-03-21 PROCEDURE — 87077 CULTURE AEROBIC IDENTIFY: CPT

## 2019-03-21 PROCEDURE — 77030034850

## 2019-03-21 PROCEDURE — 81001 URINALYSIS AUTO W/SCOPE: CPT

## 2019-03-21 PROCEDURE — 74011000250 HC RX REV CODE- 250: Performed by: EMERGENCY MEDICINE

## 2019-03-21 PROCEDURE — 87186 SC STD MICRODIL/AGAR DIL: CPT

## 2019-03-21 PROCEDURE — 99285 EMERGENCY DEPT VISIT HI MDM: CPT

## 2019-03-21 RX ORDER — CEPHALEXIN 250 MG/1
1000 CAPSULE ORAL
Status: COMPLETED | OUTPATIENT
Start: 2019-03-21 | End: 2019-03-22

## 2019-03-21 RX ORDER — HYDROCODONE BITARTRATE AND ACETAMINOPHEN 7.5; 325 MG/1; MG/1
1 TABLET ORAL
Status: COMPLETED | OUTPATIENT
Start: 2019-03-21 | End: 2019-03-21

## 2019-03-21 RX ORDER — CEPHALEXIN 500 MG/1
1000 CAPSULE ORAL 2 TIMES DAILY
Qty: 40 CAP | Refills: 0 | Status: SHIPPED | OUTPATIENT
Start: 2019-03-21 | End: 2019-03-31

## 2019-03-21 RX ORDER — LIDOCAINE HYDROCHLORIDE 20 MG/ML
JELLY TOPICAL ONCE
Status: COMPLETED | OUTPATIENT
Start: 2019-03-21 | End: 2019-03-21

## 2019-03-21 RX ADMIN — LIDOCAINE HYDROCHLORIDE 1 ML: 20 JELLY TOPICAL at 21:45

## 2019-03-21 RX ADMIN — HYDROCODONE BITARTRATE AND ACETAMINOPHEN 1 TABLET: 7.5; 325 TABLET ORAL at 21:44

## 2019-03-22 VITALS
OXYGEN SATURATION: 93 % | HEART RATE: 93 BPM | SYSTOLIC BLOOD PRESSURE: 120 MMHG | BODY MASS INDEX: 39.14 KG/M2 | HEIGHT: 72 IN | DIASTOLIC BLOOD PRESSURE: 68 MMHG | RESPIRATION RATE: 16 BRPM | TEMPERATURE: 98.7 F | WEIGHT: 289 LBS

## 2019-03-22 PROCEDURE — 74011250637 HC RX REV CODE- 250/637: Performed by: EMERGENCY MEDICINE

## 2019-03-22 RX ADMIN — CEPHALEXIN 1000 MG: 250 CAPSULE ORAL at 00:07

## 2019-03-22 NOTE — ED NOTES
Patient catheter removed without issue, moderated amount of thick bright blood oozing from insertion site.

## 2019-03-22 NOTE — ED TRIAGE NOTES
Pt brought to ED via EMS c/o urinary catheter problem. Pt states he \"is supposed to see urology again on the 27th, but I accidentally stepped on the tubing yesterday and it came out some.  There was blood and it hurt, and I don't think it's drained any pee since\"

## 2019-03-22 NOTE — DISCHARGE INSTRUCTIONS
Patient Education        Urinary Tract Infections in Men: Care Instructions  Your Care Instructions    A urinary tract infection, or UTI, is a general term for an infection anywhere between the kidneys and the tip of the penis. UTIs can also be a result of a prostate problem. Most cause pain or burning when you urinate. Most UTIs are caused by bacteria and can be cured with antibiotics. It is important to complete your treatment so that the infection does not get worse. Follow-up care is a key part of your treatment and safety. Be sure to make and go to all appointments, and call your doctor if you are having problems. It's also a good idea to know your test results and keep a list of the medicines you take. How can you care for yourself at home? · Take your antibiotics as prescribed. Do not stop taking them just because you feel better. You need to take the full course of antibiotics. · Take your medicines exactly as prescribed. Your doctor may have prescribed a medicine, such as phenazopyridine (Pyridium), to help relieve pain when you urinate. This turns your urine orange. You may stop taking it when your symptoms get better. But be sure to take all of your antibiotics, which treat the infection. · Drink extra water for the next day or two. This will help make the urine less concentrated and help wash out the bacteria causing the infection. (If you have kidney, heart, or liver disease and have to limit your fluids, talk with your doctor before you increase your fluid intake.)  · Avoid drinks that are carbonated or have caffeine. They can irritate the bladder. · Urinate often. Try to empty your bladder each time. · To relieve pain, take a hot bath or lay a heating pad (set on low) over your lower belly or genital area. Never go to sleep with a heating pad in place. To help prevent UTIs  · Drink plenty of fluids, enough so that your urine is light yellow or clear like water.  If you have kidney, heart, or liver disease and have to limit fluids, talk with your doctor before you increase the amount of fluids you drink. · Urinate when you have the urge. Do not hold your urine for a long time. Urinate before you go to sleep. · Keep your penis clean. Catheter care  If you have a drainage tube (catheter) in place, the following steps will help you care for it. · Always wash your hands before and after touching your catheter. · Check the area around the urethra for inflammation or signs of infection. Signs of infection include irritated, swollen, red, or tender skin, or pus around the catheter. · Clean the area around the catheter with soap and water two times a day. Dry with a clean towel afterward. · Do not apply powder or lotion to the skin around the catheter. To empty the urine collection bag  · Wash your hands with soap and water. · Without touching the drain spout, remove the spout from its sleeve at the bottom of the collection bag. Open the valve on the spout. · Let the urine flow out of the bag and into the toilet or a container. Do not let the tubing or drain spout touch anything. · After you empty the bag, clean the end of the drain spout with tissue and water. Close the valve and put the drain spout back into its sleeve at the bottom of the collection bag. · Wash your hands with soap and water. When should you call for help? Call your doctor now or seek immediate medical care if:    · Symptoms such as a fever, chills, nausea, or vomiting get worse or happen for the first time.     · You have new pain in your back just below your rib cage. This is called flank pain.     · There is new blood or pus in your urine.     · You are not able to take or keep down your antibiotics.    Watch closely for changes in your health, and be sure to contact your doctor if:    · You are not getting better after taking an antibiotic for 2 days.     · Your symptoms go away but then come back.    Where can you learn more?  Go to http://adrián-mark.info/. Enter I385 in the search box to learn more about \"Urinary Tract Infections in Men: Care Instructions. \"  Current as of: March 20, 2018  Content Version: 11.9  © 8635-4169 [employer]. Care instructions adapted under license by [employer] (which disclaims liability or warranty for this information). If you have questions about a medical condition or this instruction, always ask your healthcare professional. [place] [address] any warranty or liability for your use of this information.

## 2019-03-22 NOTE — ED NOTES
I have reviewed discharge instructions with the patient and spouse. The patient and spouse verbalized understanding.   Patient armband removed and shredded

## 2019-03-22 NOTE — ED NOTES
New mohamud catheter placed, patient tolerated well. Catheter draining dark red urine/blood at this time. Mild sediment but no clots noted.

## 2019-03-22 NOTE — ED PROVIDER NOTES
Duyen Marcus is a 76 y.o. Male who has current indwelling catheter that was placed by urology as he is pending a possible surgery in a couple of weeks who states that he actually pulled out his Zhang a little bit yesterday and has complained of increased pain since then with some blood in the tube and now has not draining any urine. He denies any nausea or, vomiting, diarrhea, fever chills or sweats he has a constant pressure in his lower abdomen that is worse with palpation and attempted urination. The history is provided by the patient. No past medical history on file. No past surgical history on file. No family history on file.     Social History     Socioeconomic History    Marital status:      Spouse name: Not on file    Number of children: Not on file    Years of education: Not on file    Highest education level: Not on file   Occupational History    Not on file   Social Needs    Financial resource strain: Not on file    Food insecurity:     Worry: Not on file     Inability: Not on file    Transportation needs:     Medical: Not on file     Non-medical: Not on file   Tobacco Use    Smoking status: Not on file   Substance and Sexual Activity    Alcohol use: Not on file    Drug use: Not on file    Sexual activity: Not on file   Lifestyle    Physical activity:     Days per week: Not on file     Minutes per session: Not on file    Stress: Not on file   Relationships    Social connections:     Talks on phone: Not on file     Gets together: Not on file     Attends [employer] service: Not on file     Active member of club or organization: Not on file     Attends meetings of clubs or organizations: Not on file     Relationship status: Not on file    Intimate partner violence:     Fear of current or ex partner: Not on file     Emotionally abused: Not on file     Physically abused: Not on file     Forced sexual activity: Not on file   Other Topics Concern    Not on file   Social History Narrative    Not on file         ALLERGIES: Patient has no known allergies. Review of Systems   Constitutional: Negative for fever. HENT: Negative for sore throat. Eyes: Negative for visual disturbance. Respiratory: Negative for shortness of breath. Cardiovascular: Negative for chest pain. Gastrointestinal: Positive for abdominal distention and abdominal pain. Genitourinary: Positive for difficulty urinating. Musculoskeletal: Negative for gait problem. Skin: Negative for rash. Allergic/Immunologic: Negative for immunocompromised state. Neurological: Negative for syncope. Psychiatric/Behavioral: Positive for sleep disturbance. Vitals:    03/21/19 2120 03/21/19 2129   BP: 144/81    Pulse: 93    Resp: 16    Temp:  98.7 °F (37.1 °C)   SpO2: 96%    Weight: 131.1 kg (289 lb)    Height: 6' (1.829 m)             Physical Exam   Constitutional: He is oriented to person, place, and time. He appears well-developed and well-nourished. Non-toxic appearance. He does not have a sickly appearance. He does not appear ill. He appears distressed. HENT:   Head: Normocephalic and atraumatic. Right Ear: External ear normal.   Left Ear: External ear normal.   Nose: Nose normal.   Mouth/Throat: Oropharynx is clear and moist. No oropharyngeal exudate. Eyes: Conjunctivae are normal.   Neck: Normal range of motion. Cardiovascular: Normal rate, regular rhythm, normal heart sounds and intact distal pulses. Pulmonary/Chest: Effort normal and breath sounds normal. No respiratory distress. Abdominal: Soft. He exhibits distension. There is tenderness. Genitourinary:   Genitourinary Comments: There is a Zhang catheter in his meatus that has dark urine in the tube. There is no active bleeding or swelling. Musculoskeletal: Normal range of motion. He exhibits no edema. Neurological: He is alert and oriented to person, place, and time. Skin: Skin is warm and dry.  Capillary refill takes less than 2 seconds. He is not diaphoretic. Psychiatric: His behavior is normal.   Nursing note and vitals reviewed. MDM       Procedures    Vitals:  Patient Vitals for the past 12 hrs:   Temp Pulse Resp BP SpO2   03/21/19 2129 98.7 °F (37.1 °C) -- -- -- --   03/21/19 2120 -- 93 16 144/81 96 %         Medications ordered:   Medications   cephALEXin (KEFLEX) capsule 1,000 mg (has no administration in time range)   HYDROcodone-acetaminophen (NORCO) 7.5-325 mg per tablet 1 Tab (1 Tab Oral Given 3/21/19 2144)   lidocaine (URO-JET/ GLYDO) 2 % jelly (1 mL Urethral Given 3/21/19 2145)         Lab findings:  Recent Results (from the past 12 hour(s))   URINALYSIS W/ RFLX MICROSCOPIC    Collection Time: 03/21/19 10:50 PM   Result Value Ref Range    Color BROWN      Appearance TURBID      Specific gravity 1.012 1.005 - 1.030      pH (UA) 5.5 5.0 - 8.0      Protein 100 (A) NEG mg/dL    Glucose NEGATIVE  NEG mg/dL    Ketone NEGATIVE  NEG mg/dL    Bilirubin NEGATIVE  NEG      Blood LARGE (A) NEG      Urobilinogen 0.2 0.2 - 1.0 EU/dL    Nitrites NEGATIVE  NEG      Leukocyte Esterase MODERATE (A) NEG         EKG interpretation by ED Physician:      X-Ray, CT or other radiology findings or impressions:  No orders to display       Progress notes, Consult notes or additional Procedure notes:   Patient feeling better will place on antibiotics pending his urology evaluation. Do not feel he requires other workup at this time. I have discussed with patient and/or family/sig other the results, interpretation of any imaging if performed, suspected diagnosis and treatment plan to include instructions regarding the diagnoses listed to which understanding was expressed with all questions answered        Reevaluation of patient:   stable    Disposition:  Diagnosis:   1. Urinary catheter (Zhang) change required    2.  Acute UTI        Disposition: home      Follow-up Information     Follow up With Specialties Details Why Contact Info Follow-up with your urologist as scheduled        [facility] EMERGENCY DEPT Emergency Medicine  If symptoms worsen [address]  [phone]            Patient's Medications   Start Taking    CEPHALEXIN (KEFLEX) 500 MG CAPSULE    Take 2 Caps by mouth two (2) times a day for 10 days. Continue Taking    ACETAMINOPHEN (TYLENOL) 325 MG TABLET    Take 975 mg by mouth three (3) times daily. ALLOPURINOL (ZYLOPRIM) 100 MG TABLET    Take 100 mg by mouth daily. ASPIRIN (ASPIRIN) 325 MG TABLET    Take 325 mg by mouth daily. ATORVASTATIN (LIPITOR) 20 MG TABLET    Take 20 mg by mouth nightly. CALCIPOTRIENE (DOVONEX) 0.005 % TOPICAL CREAM    Apply  to affected area two (2) times a day. CHLORTHALIDONE (HYGROTEN) 25 MG TABLET    Take 25 mg by mouth daily. CHOLECALCIFEROL (VITAMIN D3) 1,000 UNIT CAP    Take 1,000 Units by mouth daily. 2 tabs    COLCHICINE 0.6 MG TABLET    Take 0.6 mg by mouth as needed for Other (GOUT). DOCUSATE SODIUM (COLACE) 100 MG CAPSULE    Take 100 mg by mouth two (2) times daily as needed for Constipation. DORZOLAMIDE-TIMOLOL (COSOPT) 22.3-6.8 MG/ML OPHTHALMIC SOLUTION    Administer 1 Drop to both eyes two (2) times a day. GLYBURIDE (DIABETA) 5 MG TABLET    Take 5 mg by mouth Daily (before breakfast). LATANOPROST (XALATAN) 0.005 % OPHTHALMIC SOLUTION    Administer 1 Drop to both eyes nightly. LISINOPRIL (PRINIVIL, ZESTRIL) 40 MG TABLET    Take 40 mg by mouth daily. MAGNESIUM OXIDE (MAG-OX) 400 MG TABLET    Take 420 mg by mouth daily. NIFEDIPINE ER (ADALAT CC) 60 MG ER TABLET    Take 60 mg by mouth daily. OMEPRAZOLE (PRILOSEC) 20 MG CAPSULE    Take 20 mg by mouth daily. TAMSULOSIN (FLOMAX) 0.4 MG CAPSULE    Take 1 Cap by mouth daily.    These Medications have changed    No medications on file   Stop Taking    No medications on file

## 2019-03-24 LAB
BACTERIA SPEC CULT: ABNORMAL
SERVICE CMNT-IMP: ABNORMAL

## 2019-03-27 ENCOUNTER — HOSPITAL ENCOUNTER (EMERGENCY)
Age: 74
Discharge: HOME | End: 2019-03-27
Attending: EMERGENCY MEDICINE | Admitting: EMERGENCY MEDICINE
Payer: MEDICARE

## 2019-03-27 VITALS
HEART RATE: 81 BPM | RESPIRATION RATE: 18 BRPM | BODY MASS INDEX: 39.14 KG/M2 | OXYGEN SATURATION: 95 % | WEIGHT: 289 LBS | TEMPERATURE: 97.9 F | DIASTOLIC BLOOD PRESSURE: 75 MMHG | HEIGHT: 72 IN | SYSTOLIC BLOOD PRESSURE: 136 MMHG

## 2019-03-27 DIAGNOSIS — R33.8 ACUTE URINARY RETENTION: Primary | ICD-10-CM

## 2019-03-27 LAB
APPEARANCE UR: CLEAR
BACTERIA URNS QL MICRO: NEGATIVE /HPF
BILIRUB UR QL: NEGATIVE
COLOR UR: YELLOW
EPITH CASTS URNS QL MICRO: NORMAL /LPF (ref 0–5)
GLUCOSE UR STRIP.AUTO-MCNC: NEGATIVE MG/DL
HGB UR QL STRIP: ABNORMAL
KETONES UR QL STRIP.AUTO: NEGATIVE MG/DL
LEUKOCYTE ESTERASE UR QL STRIP.AUTO: ABNORMAL
NITRITE UR QL STRIP.AUTO: NEGATIVE
PH UR STRIP: 5 [PH] (ref 5–8)
PROT UR STRIP-MCNC: 100 MG/DL
RBC #/AREA URNS HPF: NORMAL /HPF (ref 0–5)
SP GR UR REFRACTOMETRY: 1.01 (ref 1–1.03)
UROBILINOGEN UR QL STRIP.AUTO: 0.2 EU/DL (ref 0.2–1)
WBC URNS QL MICRO: NORMAL /HPF (ref 0–4)

## 2019-03-27 PROCEDURE — 87086 URINE CULTURE/COLONY COUNT: CPT

## 2019-03-27 PROCEDURE — 81001 URINALYSIS AUTO W/SCOPE: CPT

## 2019-03-27 PROCEDURE — 77030034849

## 2019-03-27 PROCEDURE — 51702 INSERT TEMP BLADDER CATH: CPT

## 2019-03-27 PROCEDURE — 99282 EMERGENCY DEPT VISIT SF MDM: CPT

## 2019-03-27 PROCEDURE — 74011000250 HC RX REV CODE- 250: Performed by: EMERGENCY MEDICINE

## 2019-03-27 RX ORDER — LIDOCAINE HYDROCHLORIDE 20 MG/ML
JELLY TOPICAL ONCE
Status: COMPLETED | OUTPATIENT
Start: 2019-03-27 | End: 2019-03-27

## 2019-03-27 RX ADMIN — LIDOCAINE HYDROCHLORIDE: 20 JELLY TOPICAL at 20:05

## 2019-03-28 NOTE — DISCHARGE INSTRUCTIONS
Patient Education        Urinary Retention: Care Instructions  Your Care Instructions    Urinary retention means that you aren't able to urinate. In men, it is often caused by a blockage of the urinary tract from an enlarged prostate gland. In men and women, it can also be caused by an infection or nerve damage. Or it may be a side effect of a medicine. The doctor may have drained the urine from your bladder. If you still have problems passing urine, you may need to use a catheter at home. This is used to empty your bladder until the problem can be fixed. Your doctor may put a catheter in your bladder before you go home. If so, he or she will tell you when to come back to have the catheter removed. The doctor has checked you closely. But problems can develop later. If you notice any problems or new symptoms, get medical treatment right away. Follow-up care is a key part of your treatment and safety. Be sure to make and go to all appointments, and call your doctor if you are having problems. It's also a good idea to know your test results and keep a list of the medicines you take. How can you care for yourself at home? · Take your medicines exactly as prescribed. Call your doctor if you think you are having a problem with your medicine. You will get more details on the specific medicines your doctor prescribes. · Check with your doctor before you use any over-the-counter medicines. Many cold and allergy medicines, for example, can make this problem worse. Make sure your doctor knows all of the medicines, vitamins, supplements, and herbal remedies you take. · Spread out through the day the amount of fluid you drink. Do not drink a lot at bedtime. · Avoid alcohol and caffeine. · If you have been given a catheter, or if one is already in place, follow the instructions you were given. Always wash your hands before and after you handle the catheter. When should you call for help?   Call your doctor now or seek immediate medical care if:    · You cannot urinate at all, or it is getting harder to urinate.     · You have symptoms of a urinary tract infection. These may include:  ? Pain or burning when you urinate. ? A frequent need to urinate without being able to pass much urine. ? Pain in the flank, which is just below the rib cage and above the waist on either side of the back. ? Blood in your urine. ? A fever.    Watch closely for changes in your health, and be sure to contact your doctor if:    · You have any problems with your catheter.     · You do not get better as expected. Where can you learn more? Go to http://adrián-mark.info/. Enter M244 in the search box to learn more about \"Urinary Retention: Care Instructions. \"  Current as of: March 20, 2018  Content Version: 11.9  © 6131-4536 [employer]. Care instructions adapted under license by [employer] (which disclaims liability or warranty for this information). If you have questions about a medical condition or this instruction, always ask your healthcare professional. [address] any warranty or liability for your use of this information.

## 2019-03-28 NOTE — ED NOTES
I have reviewed discharge instructions with the patient. The patient verbalized understanding. Patient armband  given to patient to take home.   Patient was informed of the privacy risks if armband lost or stolen

## 2019-03-28 NOTE — ED PROVIDER NOTES
Fara Garcia is a 76 y.o. Male who has a history of urinary retention had his Zhang catheter taken out in the office today at the urologist and was unable to urinate afterwards. Started having increased lower abdominal pressure and urge to urinate but cannot. He denies any nausea or vomiting or fevers. Patient has  continued taking antibiotics. Patient had a Zhang catheter placed here prior to my evaluation and feels much better. The history is provided by the patient and medical records. History reviewed. No pertinent past medical history. History reviewed. No pertinent surgical history. History reviewed. No pertinent family history.     Social History     Socioeconomic History    Marital status:      Spouse name: Not on file    Number of children: Not on file    Years of education: Not on file    Highest education level: Not on file   Occupational History    Not on file   Social Needs    Financial resource strain: Not on file    Food insecurity:     Worry: Not on file     Inability: Not on file    Transportation needs:     Medical: Not on file     Non-medical: Not on file   Tobacco Use    Smoking status: Never Smoker    Smokeless tobacco: Never Used   Substance and Sexual Activity    Alcohol use: Not on file    Drug use: Not on file    Sexual activity: Not on file   Lifestyle    Physical activity:     Days per week: Not on file     Minutes per session: Not on file    Stress: Not on file   Relationships    Social connections:     Talks on phone: Not on file     Gets together: Not on file     Attends [employer] service: Not on file     Active member of club or organization: Not on file     Attends meetings of clubs or organizations: Not on file     Relationship status: Not on file    Intimate partner violence:     Fear of current or ex partner: Not on file     Emotionally abused: Not on file     Physically abused: Not on file     Forced sexual activity: Not on file   Other Topics Concern    Not on file   Social History Narrative    Not on file         ALLERGIES: Patient has no known allergies. Review of Systems   Constitutional: Negative for fever. HENT: Negative for sore throat. Eyes: Negative for visual disturbance. Respiratory: Negative for shortness of breath. Cardiovascular: Negative for chest pain. Gastrointestinal: Positive for abdominal distention and abdominal pain. Genitourinary: Positive for difficulty urinating. Musculoskeletal: Negative for gait problem. Skin: Negative for rash. Allergic/Immunologic: Negative for immunocompromised state. Neurological: Negative for syncope. Psychiatric/Behavioral: Positive for sleep disturbance. Vitals:    03/27/19 1946   BP: 136/75   Pulse: 81   Resp: 18   Temp: 97.9 °F (36.6 °C)   SpO2: 95%   Weight: 131.1 kg (289 lb)   Height: 6' (1.829 m)            Physical Exam   Constitutional: He is oriented to person, place, and time. Non-toxic appearance. He does not appear ill. No distress. HENT:   Head: Normocephalic and atraumatic. Right Ear: External ear normal.   Left Ear: External ear normal.   Nose: Nose normal.   Mouth/Throat: Oropharynx is clear and moist. No oropharyngeal exudate. Eyes: Conjunctivae are normal.   Neck: Normal range of motion. Cardiovascular: Normal rate, regular rhythm, normal heart sounds and intact distal pulses. Pulmonary/Chest: Effort normal and breath sounds normal. No respiratory distress. Abdominal: Soft. There is no tenderness. Genitourinary: Penis normal.   Genitourinary Comments: No bleeding from meatus   Musculoskeletal: Normal range of motion. He exhibits no edema. Neurological: He is alert and oriented to person, place, and time. Skin: Skin is warm and dry. He is not diaphoretic. Psychiatric: His behavior is normal.   Nursing note and vitals reviewed.        MDM       Procedures    Vitals:  Patient Vitals for the past 12 hrs:   Temp Pulse Resp BP SpO2 03/27/19 1946 97.9 °F (36.6 °C) 81 18 136/75 95 %         Medications ordered:   Medications   lidocaine (URO-JET/ GLYDO) 2 % jelly ( Urethral Given 3/27/19 2005)         Lab findings:  No results found for this or any previous visit (from the past 12 hour(s)). EKG interpretation by ED Physician:      X-Ray, CT or other radiology findings or impressions:  No orders to display       Progress notes, Consult notes or additional Procedure notes: We will have patient keep Zhang catheter and maintain his current antibiotic regimen. We will repeat the urine culture again. I have discussed with patient and/or family/sig other the results, interpretation of any imaging if performed, suspected diagnosis and treatment plan to include instructions regarding the diagnoses listed to which understanding was expressed with all questions answered      Reevaluation of patient:   stable    Disposition:  Diagnosis:   1. Acute urinary retention        Disposition: home    Follow-up Information     Follow up With Specialties Details Why Contact Info    Wiliam Lawler MD Urology Schedule an appointment as soon as possible for a visit for next week for recheck [address] [place] [address] EMERGENCY DEPT Emergency Medicine  If symptoms worsen [address] [facility]  [phone]            Patient's Medications   Start Taking    No medications on file   Continue Taking    ACETAMINOPHEN (TYLENOL) 325 MG TABLET    Take 975 mg by mouth three (3) times daily. ACETAMINOPHEN (TYLENOL) 325 MG TABLET    Take  by mouth every four (4) hours as needed for Pain. ALLOPURINOL (ZYLOPRIM) 100 MG TABLET    Take 100 mg by mouth daily. ALLOPURINOL (ZYLOPRIM) 100 MG TABLET    Take  by mouth daily. ASPIRIN (ASPIRIN) 325 MG TABLET    Take 325 mg by mouth daily. ASPIRIN (ASPIRIN) 325 MG TABLET    Take 325 mg by mouth daily.     ATORVASTATIN (LIPITOR) 20 MG TABLET    Take 20 mg by mouth nightly. ATORVASTATIN (LIPITOR) 20 MG TABLET    Take  by mouth daily. BRIMONIDINE-DORZOLAMIDE, PF, 0.15-2 % DROP    Apply  to eye. CALCIPOTRIENE (DOVONEX) 0.005 % TOPICAL CREAM    Apply  to affected area two (2) times a day. CALCIPOTRIENE (DOVONEX) 0.005 % TOPICAL CREAM    Apply  to affected area two (2) times a day. CEPHALEXIN (KEFLEX) 500 MG CAPSULE    Take 2 Caps by mouth two (2) times a day for 10 days. CEPHALEXIN (KEFLEX) 500 MG CAPSULE    TK 2 CS PO BID FOR 10 DAYS    CHLORTHALIDONE (HYGROTEN) 25 MG TABLET    Take 25 mg by mouth daily. CHLORTHALIDONE (HYGROTEN) 25 MG TABLET    Take  by mouth daily. CHOLECALCIFEROL (VITAMIN D3) 1,000 UNIT CAP    Take 1,000 Units by mouth daily. 2 tabs    CHOLECALCIFEROL (VITAMIN D3) 1,000 UNIT CAP    Take  by mouth daily. COLCHICINE 0.6 MG TABLET    Take 0.6 mg by mouth as needed for Other (GOUT). COLCHICINE 0.6 MG TABLET    Take 0.6 mg by mouth daily. DOCUSATE SODIUM (COLACE) 100 MG CAPSULE    Take 100 mg by mouth two (2) times daily as needed for Constipation. DORZOLAMIDE-TIMOLOL (COSOPT) 22.3-6.8 MG/ML OPHTHALMIC SOLUTION    Administer 1 Drop to both eyes two (2) times a day. GLYBURIDE (DIABETA) 5 MG TABLET    Take 5 mg by mouth Daily (before breakfast). GLYBURIDE (DIABETA) 5 MG TABLET    Take  by mouth Daily (before breakfast). LATANOPROST (XALATAN) 0.005 % OPHTHALMIC SOLUTION    Administer 1 Drop to both eyes nightly. LATANOPROST (XALATAN) 0.005 % OPHTHALMIC SOLUTION    Administer 1 Drop to both eyes nightly. LISINOPRIL (PRINIVIL, ZESTRIL) 40 MG TABLET    Take 40 mg by mouth daily. LISINOPRIL (PRINIVIL, ZESTRIL) 40 MG TABLET    Take 40 mg by mouth daily. MAGNESIUM OXIDE (MAG-OX) 400 MG TABLET    Take 420 mg by mouth daily. MAGNESIUM OXIDE (MAG-OX) 400 MG TABLET    Take 400 mg by mouth daily. NIFEDIPINE ER (ADALAT CC) 60 MG ER TABLET    Take 60 mg by mouth daily.     NIFEDIPINE ER (ADALAT CC) 60 MG ER TABLET    Take 60 mg by mouth daily. OMEPRAZOLE (PRILOSEC) 20 MG CAPSULE    Take 20 mg by mouth daily. OMEPRAZOLE (PRILOSEC) 20 MG CAPSULE    Take 20 mg by mouth daily. TAMSULOSIN (FLOMAX) 0.4 MG CAPSULE    Take 1 Cap by mouth daily.     TAMSULOSIN (FLOMAX) 0.4 MG CAPSULE    TK 1 C PO QD   These Medications have changed    No medications on file   Stop Taking    No medications on file

## 2019-03-29 LAB
BACTERIA SPEC CULT: NORMAL
SERVICE CMNT-IMP: NORMAL

## 2019-04-30 ENCOUNTER — HOSPITAL ENCOUNTER (EMERGENCY)
Age: 74
Discharge: HOME | End: 2019-04-30
Attending: EMERGENCY MEDICINE
Payer: MEDICARE

## 2019-04-30 VITALS
DIASTOLIC BLOOD PRESSURE: 75 MMHG | SYSTOLIC BLOOD PRESSURE: 145 MMHG | RESPIRATION RATE: 18 BRPM | WEIGHT: 289 LBS | TEMPERATURE: 98.4 F | BODY MASS INDEX: 39.14 KG/M2 | HEART RATE: 96 BPM | OXYGEN SATURATION: 98 % | HEIGHT: 72 IN

## 2019-04-30 DIAGNOSIS — T83.9XXA FOLEY CATHETER PROBLEM, INITIAL ENCOUNTER (HCC): Primary | ICD-10-CM

## 2019-04-30 LAB
APPEARANCE UR: CLEAR
BACTERIA URNS QL MICRO: NEGATIVE /HPF
BILIRUB UR QL: NEGATIVE
COLOR UR: YELLOW
EPITH CASTS URNS QL MICRO: NEGATIVE /LPF (ref 0–5)
GLUCOSE UR STRIP.AUTO-MCNC: NEGATIVE MG/DL
HGB UR QL STRIP: ABNORMAL
KETONES UR QL STRIP.AUTO: NEGATIVE MG/DL
LEUKOCYTE ESTERASE UR QL STRIP.AUTO: ABNORMAL
NITRITE UR QL STRIP.AUTO: NEGATIVE
PH UR STRIP: 6 [PH] (ref 5–8)
PROT UR STRIP-MCNC: 30 MG/DL
RBC #/AREA URNS HPF: NORMAL /HPF (ref 0–5)
SP GR UR REFRACTOMETRY: 1.01 (ref 1–1.03)
UROBILINOGEN UR QL STRIP.AUTO: 0.2 EU/DL (ref 0.2–1)
WBC URNS QL MICRO: NORMAL /HPF (ref 0–4)

## 2019-04-30 PROCEDURE — 99284 EMERGENCY DEPT VISIT MOD MDM: CPT

## 2019-04-30 PROCEDURE — 87186 SC STD MICRODIL/AGAR DIL: CPT

## 2019-04-30 PROCEDURE — 87077 CULTURE AEROBIC IDENTIFY: CPT

## 2019-04-30 PROCEDURE — 77030034849

## 2019-04-30 PROCEDURE — 81001 URINALYSIS AUTO W/SCOPE: CPT

## 2019-04-30 PROCEDURE — 87086 URINE CULTURE/COLONY COUNT: CPT

## 2019-04-30 PROCEDURE — 74011000250 HC RX REV CODE- 250: Performed by: EMERGENCY MEDICINE

## 2019-04-30 RX ORDER — LIDOCAINE HYDROCHLORIDE 20 MG/ML
JELLY TOPICAL
Status: DISPENSED
Start: 2019-04-30 | End: 2019-04-30

## 2019-04-30 RX ORDER — LIDOCAINE HYDROCHLORIDE 20 MG/ML
JELLY TOPICAL ONCE
Status: COMPLETED | OUTPATIENT
Start: 2019-04-30 | End: 2019-04-30

## 2019-04-30 RX ADMIN — LIDOCAINE HYDROCHLORIDE: 20 JELLY TOPICAL at 02:12

## 2019-04-30 NOTE — ED TRIAGE NOTES
Pt brought to ED via EMS c/o burning right around the catheter x2-3 days. States had appt with urology tomorrow to see if catheter can be removed, has had in place since 3/8/19.  States wife \"changed from the leg bag to the big bag to lay down for bed\" and hadn't noticed any urine output since about 1700

## 2019-04-30 NOTE — ED PROVIDER NOTES
Carolyn Russlel is a 76 y.o. Male with history of chronic indwelling Zhang with complaints of Zhang not working tonight. Patient started feeling lower abdominal pressure after the Zhang stopped working. He denies any nausea, vomiting, diarrhea, hematuria, fever, chills, sweats. I saw the patient approximately 1 month ago and had a Zhang placed at that time and complete antibiotics. The history is provided by the patient and medical records. History reviewed. No pertinent past medical history. History reviewed. No pertinent surgical history. History reviewed. No pertinent family history.     Social History     Socioeconomic History    Marital status:      Spouse name: Not on file    Number of children: Not on file    Years of education: Not on file    Highest education level: Not on file   Occupational History    Not on file   Social Needs    Financial resource strain: Not on file    Food insecurity:     Worry: Not on file     Inability: Not on file    Transportation needs:     Medical: Not on file     Non-medical: Not on file   Tobacco Use    Smoking status: Never Smoker    Smokeless tobacco: Never Used   Substance and Sexual Activity    Alcohol use: Not on file    Drug use: Not on file    Sexual activity: Not on file   Lifestyle    Physical activity:     Days per week: Not on file     Minutes per session: Not on file    Stress: Not on file   Relationships    Social connections:     Talks on phone: Not on file     Gets together: Not on file     Attends [employer] service: Not on file     Active member of club or organization: Not on file     Attends meetings of clubs or organizations: Not on file     Relationship status: Not on file    Intimate partner violence:     Fear of current or ex partner: Not on file     Emotionally abused: Not on file     Physically abused: Not on file     Forced sexual activity: Not on file   Other Topics Concern    Not on file   Social History Narrative    Not on file         ALLERGIES: Patient has no known allergies. Review of Systems   Constitutional: Negative for fever. HENT: Negative for sore throat. Respiratory: Negative for shortness of breath. Cardiovascular: Negative for chest pain. Gastrointestinal: Positive for abdominal distention and abdominal pain. Negative for blood in stool and constipation. Endocrine: Negative for polyuria. Genitourinary: Positive for difficulty urinating. Musculoskeletal: Negative for gait problem. Allergic/Immunologic: Negative for immunocompromised state. Neurological: Negative for syncope. Psychiatric/Behavioral: Positive for sleep disturbance. Vitals:    04/30/19 0154   BP: 170/84   Pulse: 96   Resp: 18   Temp: 98.4 °F (36.9 °C)   SpO2: 97%   Weight: 131.1 kg (289 lb)   Height: 6' (1.829 m)            Physical Exam   Constitutional: He is oriented to person, place, and time. He appears well-developed and well-nourished. Non-toxic appearance. He does not have a sickly appearance. He does not appear ill. No distress. HENT:   Head: Normocephalic and atraumatic. Right Ear: External ear normal.   Left Ear: External ear normal.   Nose: Nose normal.   Mouth/Throat: Oropharynx is clear and moist. No oropharyngeal exudate. Eyes: Conjunctivae are normal.   Neck: Normal range of motion. Cardiovascular: Normal rate, regular rhythm, normal heart sounds and intact distal pulses. Pulmonary/Chest: Effort normal and breath sounds normal. No respiratory distress. Abdominal: Soft. He exhibits distension. There is tenderness in the suprapubic area. There is no rigidity, no rebound, no guarding and no CVA tenderness. Genitourinary: Testes normal and penis normal. Circumcised. Musculoskeletal: Normal range of motion. He exhibits no edema. Neurological: He is alert and oriented to person, place, and time. Skin: Skin is warm and dry. He is not diaphoretic.    Psychiatric: His behavior is normal.   Nursing note and vitals reviewed. MDM       Procedures    Vitals:  Patient Vitals for the past 12 hrs:   Temp Pulse Resp BP SpO2   04/30/19 0154 98.4 °F (36.9 °C) 96 18 170/84 97 %         Medications ordered:   Medications   lidocaine (URO-JET/ GLYDO) 2 % jelly ( Urethral Given 4/30/19 0212)         Lab findings:  Recent Results (from the past 12 hour(s))   URINALYSIS W/ RFLX MICROSCOPIC    Collection Time: 04/30/19  2:00 AM   Result Value Ref Range    Color YELLOW      Appearance CLEAR      Specific gravity 1.012 1.005 - 1.030      pH (UA) 6.0 5.0 - 8.0      Protein 30 (A) NEG mg/dL    Glucose NEGATIVE  NEG mg/dL    Ketone NEGATIVE  NEG mg/dL    Bilirubin NEGATIVE  NEG      Blood MODERATE (A) NEG      Urobilinogen 0.2 0.2 - 1.0 EU/dL    Nitrites NEGATIVE  NEG      Leukocyte Esterase SMALL (A) NEG     URINE MICROSCOPIC ONLY    Collection Time: 04/30/19  2:00 AM   Result Value Ref Range    WBC 4 to 10 0 - 4 /hpf    RBC 21 to 35 0 - 5 /hpf    Epithelial cells NEGATIVE  0 - 5 /lpf    Bacteria NEGATIVE  NEG /hpf       EKG interpretation by ED Physician:      X-Ray, CT or other radiology findings or impressions:  No orders to display       Progress notes, Consult notes or additional Procedure notes:   No bacteria in the urine. I will wait on culture for antibiotics. Patient feels significantly better after placement of a new Zhang    I have discussed with patient and/or family/sig other the results, interpretation of any imaging if performed, suspected diagnosis and treatment plan to include instructions regarding the diagnoses listed to which understanding was expressed with all questions answered      Reevaluation of patient:   stable    Disposition:  Diagnosis:   1. Zhang catheter problem, initial encounter [facility])        Disposition: home      Follow-up Information     Follow up With Specialties Details Why Contact Info    Follow-up with urologist for any other further issues. Patient's Medications   Start Taking    No medications on file   Continue Taking    ACETAMINOPHEN (TYLENOL) 325 MG TABLET    Take 975 mg by mouth three (3) times daily. ACETAMINOPHEN (TYLENOL) 325 MG TABLET    Take  by mouth every four (4) hours as needed for Pain. ALLOPURINOL (ZYLOPRIM) 100 MG TABLET    Take 100 mg by mouth daily. ALLOPURINOL (ZYLOPRIM) 100 MG TABLET    Take  by mouth daily. ASPIRIN (ASPIRIN) 325 MG TABLET    Take 325 mg by mouth daily. ASPIRIN (ASPIRIN) 325 MG TABLET    Take 325 mg by mouth daily. ATORVASTATIN (LIPITOR) 20 MG TABLET    Take 20 mg by mouth nightly. ATORVASTATIN (LIPITOR) 20 MG TABLET    Take  by mouth daily. BRIMONIDINE-DORZOLAMIDE, PF, 0.15-2 % DROP    Apply  to eye. CALCIPOTRIENE (DOVONEX) 0.005 % TOPICAL CREAM    Apply  to affected area two (2) times a day. CALCIPOTRIENE (DOVONEX) 0.005 % TOPICAL CREAM    Apply  to affected area two (2) times a day. CHLORTHALIDONE (HYGROTEN) 25 MG TABLET    Take 25 mg by mouth daily. CHLORTHALIDONE (HYGROTEN) 25 MG TABLET    Take  by mouth daily. CHOLECALCIFEROL (VITAMIN D3) 1,000 UNIT CAP    Take 1,000 Units by mouth daily. 2 tabs    CHOLECALCIFEROL (VITAMIN D3) 1,000 UNIT CAP    Take  by mouth daily. COLCHICINE 0.6 MG TABLET    Take 0.6 mg by mouth as needed for Other (GOUT). COLCHICINE 0.6 MG TABLET    Take 0.6 mg by mouth daily. DOCUSATE SODIUM (COLACE) 100 MG CAPSULE    Take 100 mg by mouth two (2) times daily as needed for Constipation. DORZOLAMIDE-TIMOLOL (COSOPT) 22.3-6.8 MG/ML OPHTHALMIC SOLUTION    Administer 1 Drop to both eyes two (2) times a day. GLYBURIDE (DIABETA) 5 MG TABLET    Take 5 mg by mouth Daily (before breakfast). GLYBURIDE (DIABETA) 5 MG TABLET    Take  by mouth Daily (before breakfast). LATANOPROST (XALATAN) 0.005 % OPHTHALMIC SOLUTION    Administer 1 Drop to both eyes nightly.     LATANOPROST (XALATAN) 0.005 % OPHTHALMIC SOLUTION    Administer 1 Drop to both eyes nightly. LISINOPRIL (PRINIVIL, ZESTRIL) 40 MG TABLET    Take 40 mg by mouth daily. LISINOPRIL (PRINIVIL, ZESTRIL) 40 MG TABLET    Take 40 mg by mouth daily. MAGNESIUM OXIDE (MAG-OX) 400 MG TABLET    Take 420 mg by mouth daily. MAGNESIUM OXIDE (MAG-OX) 400 MG TABLET    Take 400 mg by mouth daily. NIFEDIPINE ER (ADALAT CC) 60 MG ER TABLET    Take 60 mg by mouth daily. NIFEDIPINE ER (ADALAT CC) 60 MG ER TABLET    Take 60 mg by mouth daily. OMEPRAZOLE (PRILOSEC) 20 MG CAPSULE    Take 20 mg by mouth daily. OMEPRAZOLE (PRILOSEC) 20 MG CAPSULE    Take 20 mg by mouth daily. TAMSULOSIN (FLOMAX) 0.4 MG CAPSULE    Take 1 Cap by mouth daily.    These Medications have changed    No medications on file   Stop Taking    CEPHALEXIN (KEFLEX) 500 MG CAPSULE    TK 2 CS PO BID FOR 10 DAYS    TAMSULOSIN (FLOMAX) 0.4 MG CAPSULE    TK 1 C PO QD

## 2019-04-30 NOTE — ED NOTES
Pt denies recent trauma to mohamud. Preexisting mohamud removed, some bright red blood noted at inner most point of tubing. Patient urethra instilled with urojet for approx 5-10minutes. New mohamud placed with no difficulty, initial stream revealed bright red blood and some clot.

## 2019-05-02 LAB
BACTERIA SPEC CULT: ABNORMAL
SERVICE CMNT-IMP: ABNORMAL

## 2019-06-04 ENCOUNTER — HOSPITAL ENCOUNTER (EMERGENCY)
Age: 74
Discharge: HOME | End: 2019-06-04
Attending: EMERGENCY MEDICINE
Payer: MEDICARE

## 2019-06-04 VITALS
HEIGHT: 72 IN | DIASTOLIC BLOOD PRESSURE: 79 MMHG | WEIGHT: 287 LBS | HEART RATE: 74 BPM | OXYGEN SATURATION: 97 % | TEMPERATURE: 98.1 F | SYSTOLIC BLOOD PRESSURE: 172 MMHG | RESPIRATION RATE: 19 BRPM | BODY MASS INDEX: 38.87 KG/M2

## 2019-06-04 DIAGNOSIS — T83.511A URINARY TRACT INFECTION ASSOCIATED WITH INDWELLING URETHRAL CATHETER, INITIAL ENCOUNTER (HCC): Primary | ICD-10-CM

## 2019-06-04 DIAGNOSIS — N39.0 URINARY TRACT INFECTION ASSOCIATED WITH INDWELLING URETHRAL CATHETER, INITIAL ENCOUNTER (HCC): Primary | ICD-10-CM

## 2019-06-04 DIAGNOSIS — T83.9XXA PROBLEM WITH FOLEY CATHETER, INITIAL ENCOUNTER (HCC): ICD-10-CM

## 2019-06-04 LAB
APPEARANCE UR: CLEAR
BACTERIA URNS QL MICRO: NEGATIVE /HPF
BILIRUB UR QL: NEGATIVE
COLOR UR: YELLOW
EPITH CASTS URNS QL MICRO: NORMAL /LPF (ref 0–5)
GLUCOSE UR STRIP.AUTO-MCNC: NEGATIVE MG/DL
HGB UR QL STRIP: ABNORMAL
KETONES UR QL STRIP.AUTO: NEGATIVE MG/DL
LEUKOCYTE ESTERASE UR QL STRIP.AUTO: ABNORMAL
NITRITE UR QL STRIP.AUTO: NEGATIVE
PH UR STRIP: 6.5 [PH] (ref 5–8)
PROT UR STRIP-MCNC: 100 MG/DL
RBC #/AREA URNS HPF: NORMAL /HPF (ref 0–5)
SP GR UR REFRACTOMETRY: 1.01 (ref 1–1.03)
UROBILINOGEN UR QL STRIP.AUTO: 0.2 EU/DL (ref 0.2–1)
WBC URNS QL MICRO: NORMAL /HPF (ref 0–4)

## 2019-06-04 PROCEDURE — 51702 INSERT TEMP BLADDER CATH: CPT

## 2019-06-04 PROCEDURE — 81001 URINALYSIS AUTO W/SCOPE: CPT

## 2019-06-04 PROCEDURE — 99283 EMERGENCY DEPT VISIT LOW MDM: CPT

## 2019-06-04 PROCEDURE — 77030005514 HC CATH URETH FOL14 BARD -A

## 2019-06-04 PROCEDURE — 74011000250 HC RX REV CODE- 250: Performed by: PHYSICIAN ASSISTANT

## 2019-06-04 PROCEDURE — 87086 URINE CULTURE/COLONY COUNT: CPT

## 2019-06-04 RX ORDER — LIDOCAINE HYDROCHLORIDE 20 MG/ML
JELLY TOPICAL ONCE
Status: COMPLETED | OUTPATIENT
Start: 2019-06-04 | End: 2019-06-04

## 2019-06-04 RX ORDER — NITROFURANTOIN 25; 75 MG/1; MG/1
100 CAPSULE ORAL 2 TIMES DAILY
Qty: 10 CAP | Refills: 0 | Status: SHIPPED | OUTPATIENT
Start: 2019-06-04 | End: 2019-06-09

## 2019-06-04 RX ADMIN — LIDOCAINE HYDROCHLORIDE: 20 JELLY TOPICAL at 20:49

## 2019-06-05 NOTE — ED PROVIDER NOTES
[facility] DEPARTMENT HISTORY AND PHYSICAL EXAM    Date: 6/4/2019  Patient Name: Liberty White    History of Presenting Illness     Chief Complaint   Patient presents with    Urinary Catheter Problem         History Provided By: Patient    Chief Complaint: mohamud catheter problem  Duration: 6 Hours  Timing:  Acute  Location:   Quality: Aching and Burning  Severity: Severe  Modifying Factors: none  Associated Symptoms: denies any other associated signs or symptoms      HPI: Liberty White is a 76 y.o. male with a PMH of urinary retention, HTN, DM, high cholesterol who presents to the ER via EMS for evaluation of a Mohamud catheter problem. Patient states he has noted minimal urine output from his catheter since about 3 PM today. Patient also states his urine has been a little darker in color with more sediment in the bag recently. He denied any associated fevers. He does report increased dysuria over the last 24 hours. He denied any associated shortness of breath, chest pain, abdominal pain, nausea, vomiting and has no other symptoms or complaints. He already has a follow-up appointment with [place] [facility] later this week. PCP: Talia Souza NP    Current Outpatient Medications   Medication Sig Dispense Refill    nitrofurantoin, macrocrystal-monohydrate, (MACROBID) 100 mg capsule Take 1 Cap by mouth two (2) times a day for 5 days. 10 Cap 0    tamsulosin (FLOMAX) 0.4 mg capsule TAKE 1 CAPSULE BY MOUTH DAILY 30 Cap 2    allopurinol (ZYLOPRIM) 100 mg tablet Take  by mouth daily.  aspirin (ASPIRIN) 325 mg tablet Take 325 mg by mouth daily.  atorvastatin (LIPITOR) 20 mg tablet Take  by mouth daily.  cholecalciferol (VITAMIN D3) 1,000 unit cap Take  by mouth daily.  colchicine 0.6 mg tablet Take 0.6 mg by mouth daily.  brimonidine-dorzolamide, PF, 0.15-2 % drop Apply  to eye.  glyBURIDE (DIABETA) 5 mg tablet Take  by mouth Daily (before breakfast).       latanoprost (XALATAN) 0.005 % ophthalmic solution Administer 1 Drop to both eyes nightly.  lisinopril (PRINIVIL, ZESTRIL) 40 mg tablet Take 40 mg by mouth daily.  acetaminophen (TYLENOL) 325 mg tablet Take 975 mg by mouth three (3) times daily.  calcipotriene (DOVONEX) 0.005 % topical cream Apply  to affected area two (2) times a day.  chlorthalidone (HYGROTEN) 25 mg tablet Take 25 mg by mouth daily.  docusate sodium (COLACE) 100 mg capsule Take 100 mg by mouth two (2) times daily as needed for Constipation.  dorzolamide-timolol (COSOPT) 22.3-6.8 mg/mL ophthalmic solution Administer 1 Drop to both eyes two (2) times a day.  magnesium oxide (MAG-OX) 400 mg tablet Take 420 mg by mouth daily.  NIFEdipine ER (ADALAT CC) 60 mg ER tablet Take 60 mg by mouth daily.  omeprazole (PRILOSEC) 20 mg capsule Take 20 mg by mouth daily.  glyBURIDE (DIABETA) 5 mg tablet Take 5 mg by mouth Daily (before breakfast). Past History     Past Medical History:  History reviewed. No pertinent past medical history. Past Surgical History:  History reviewed. No pertinent surgical history. Family History:  History reviewed. No pertinent family history. Social History:  Social History     Tobacco Use    Smoking status: Never Smoker    Smokeless tobacco: Never Used   Substance Use Topics    Alcohol use: Not Currently    Drug use: Not on file       Allergies:  No Known Allergies      Review of Systems   Review of Systems   Constitutional: Negative for chills, fatigue and fever. HENT: Negative. Negative for sore throat. Eyes: Negative. Respiratory: Negative for cough and shortness of breath. Cardiovascular: Negative for chest pain and palpitations. Gastrointestinal: Negative for abdominal pain, nausea and vomiting. Genitourinary: Positive for decreased urine volume, difficulty urinating and dysuria. Catheter not draining     Musculoskeletal: Negative. Skin: Negative. Neurological: Negative for dizziness, weakness, light-headedness and headaches. Psychiatric/Behavioral: Negative. All other systems reviewed and are negative. Physical Exam     Vitals:    06/04/19 2040   BP: 172/79   Pulse: 74   Resp: 19   Temp: 98.1 °F (36.7 °C)   SpO2: 97%   Weight: 130.2 kg (287 lb)   Height: 6' (1.829 m)     Physical Exam   Constitutional: He is oriented to person, place, and time. He appears well-developed and well-nourished. No distress. HENT:   Head: Normocephalic and atraumatic. Mouth/Throat: Oropharynx is clear and moist.   Eyes: Conjunctivae are normal. No scleral icterus. Neck: Neck supple. No JVD present. No tracheal deviation present. Cardiovascular: Normal rate, regular rhythm and normal heart sounds. No murmur heard. Pulmonary/Chest: Effort normal and breath sounds normal. No respiratory distress. He has no wheezes. He has no rales. He exhibits no tenderness. Abdominal: Soft. Normal appearance and bowel sounds are normal. He exhibits no distension and no mass. There is tenderness in the suprapubic area. There is no rigidity, no rebound, no guarding, no CVA tenderness, no tenderness at McBurney's point and negative Marmolejo's sign. Mild suprapubic tenderness; no peritoneal signs   Genitourinary: Penis normal.   Genitourinary Comments: Zhang cath in place   Musculoskeletal: Normal range of motion. Neurological: He is alert and oriented to person, place, and time. He has normal strength. Gait normal. GCS eye subscore is 4. GCS verbal subscore is 5. GCS motor subscore is 6. Skin: Skin is warm and dry. He is not diaphoretic. Psychiatric: He has a normal mood and affect. Nursing note and vitals reviewed.         Diagnostic Study Results     Labs -     Recent Results (from the past 12 hour(s))   URINALYSIS W/ RFLX MICROSCOPIC    Collection Time: 06/04/19  9:03 PM   Result Value Ref Range    Color YELLOW      Appearance CLEAR      Specific gravity 1.010 1.005 - 1.030      pH (UA) 6.5 5.0 - 8.0      Protein 100 (A) NEG mg/dL    Glucose NEGATIVE  NEG mg/dL    Ketone NEGATIVE  NEG mg/dL    Bilirubin NEGATIVE  NEG      Blood SMALL (A) NEG      Urobilinogen 0.2 0.2 - 1.0 EU/dL    Nitrites NEGATIVE  NEG      Leukocyte Esterase TRACE (A) NEG         Radiologic Studies -   No orders to display     CT Results  (Last 48 hours)    None        CXR Results  (Last 48 hours)    None            Medical Decision Making   I am the first provider for this patient. I reviewed the vital signs, available nursing notes, past medical history, past surgical history, family history and social history. Vital Signs-Reviewed the patient's vital signs. Records Reviewed: Nursing Notes, Old Medical Records and Previous Laboratory Studies     507 PM  27-year-old male who presents the ER via EMS complaining of Zhang catheter problem. Patient states he has not been getting much drainage from his catheter since about 3 PM today. He reports increased pressure in his lower abdomen. Already has follow-up with [facility] of [place]. Denied any concerns for fevers chills or other complaints. States he has noticed his urine has been a little bit darker with more sediment in the Zhang bag. We will plan on Zhang catheter change and urinalysis to evaluate for infection. James Hatfield PA-C     10:10 PM  UA consistent w/ infection. Pt tolerated cath change well and reported immediate relief after draining almost 1 L. Will cover w/ macrobid and have follow up with pcp and urology as scheduled. All questions answered and patient in agreement with plan of care. Will plan for discharge. James Hatfield PA-C    Disposition:  discharged    DISCHARGE NOTE:       Care plan outlined and precautions discussed. Patient has no new complaints, changes, or physical findings. Results of ua were reviewed with the patient. All medications were reviewed with the patient; will d/c home with macrobid.  All of pt's questions and concerns were addressed. Patient was instructed and agrees to follow up with pcp and urology, as well as to return to the ED upon further deterioration. Patient is ready to go home. Follow-up Information     Follow up With Specialties Details Why [address] [facility] EMERGENCY DEPT Emergency Medicine  If symptoms worsen 600 [facility] 51    Ericka Laureano NP Nurse Practitioner Call in 1 day As needed for ER follow up [address] [place] [address]  [phone]            Current Discharge Medication List      START taking these medications    Details   nitrofurantoin, macrocrystal-monohydrate, (MACROBID) 100 mg capsule Take 1 Cap by mouth two (2) times a day for 5 days. Qty: 10 Cap, Refills: 0         CONTINUE these medications which have NOT CHANGED    Details   tamsulosin (FLOMAX) 0.4 mg capsule TAKE 1 CAPSULE BY MOUTH DAILY  Qty: 30 Cap, Refills: 2      allopurinol (ZYLOPRIM) 100 mg tablet Take  by mouth daily. aspirin (ASPIRIN) 325 mg tablet Take 325 mg by mouth daily. atorvastatin (LIPITOR) 20 mg tablet Take  by mouth daily. cholecalciferol (VITAMIN D3) 1,000 unit cap Take  by mouth daily. colchicine 0.6 mg tablet Take 0.6 mg by mouth daily. brimonidine-dorzolamide, PF, 0.15-2 % drop Apply  to eye. !! glyBURIDE (DIABETA) 5 mg tablet Take  by mouth Daily (before breakfast). latanoprost (XALATAN) 0.005 % ophthalmic solution Administer 1 Drop to both eyes nightly. lisinopril (PRINIVIL, ZESTRIL) 40 mg tablet Take 40 mg by mouth daily. acetaminophen (TYLENOL) 325 mg tablet Take 975 mg by mouth three (3) times daily. calcipotriene (DOVONEX) 0.005 % topical cream Apply  to affected area two (2) times a day. chlorthalidone (HYGROTEN) 25 mg tablet Take 25 mg by mouth daily. docusate sodium (COLACE) 100 mg capsule Take 100 mg by mouth two (2) times daily as needed for Constipation. dorzolamide-timolol (COSOPT) 22.3-6.8 mg/mL ophthalmic solution Administer 1 Drop to both eyes two (2) times a day. magnesium oxide (MAG-OX) 400 mg tablet Take 420 mg by mouth daily. NIFEdipine ER (ADALAT CC) 60 mg ER tablet Take 60 mg by mouth daily. omeprazole (PRILOSEC) 20 mg capsule Take 20 mg by mouth daily. !! glyBURIDE (DIABETA) 5 mg tablet Take 5 mg by mouth Daily (before breakfast). !! - Potential duplicate medications found. Please discuss with provider. Provider Notes (Medical Decision Making):     Procedures:  Procedures        Diagnosis     Clinical Impression:   1. Urinary tract infection associated with indwelling urethral catheter, initial encounter ([facility] [address]    2.  Problem with Zhang catheter, initial encounter ([facility] [address]

## 2019-06-05 NOTE — DISCHARGE INSTRUCTIONS
Patient Education        Learning About Urinary Catheter Care to Prevent Infection  What is a urinary catheter? A urinary catheter is a flexible plastic tube used to drain urine from your bladder when you can't urinate on your own. The catheter allows urine to drain from the bladder into a bag. Two types of drainage bags may be used with a urinary catheter. · A bedside bag is a large bag that you can hang on the side of your bed or on a chair. You can use it overnight or anytime you will be sitting or lying down for a long time. · A leg bag is a small bag that you can use during the day. It is usually attached to your thigh or calf and hidden under your clothes. Having a urinary catheter increases your risk of getting a urinary tract infection. Germs may get on the catheter and cause an infection in your bladder or kidneys. The longer you have a catheter, the more likely it is that you will get an infection. You can help prevent this problem with good hygiene and careful handling of your catheter and drainage bags. How can you help prevent infection? Take care to be clean  · Always wash your hands well before and after you handle your catheter. · Clean the skin around the catheter twice a day using soap and water. Dry with a clean towel afterward. You can shower with your catheter and drainage bag in place unless your doctor told you not to. · When you clean around the catheter, check the surrounding skin for signs of infection. Look for things like pus or irritated, swollen, red, or tender skin around the catheter. Be careful with your drainage bag  · Always keep the drainage bag below the level of your bladder. This will help keep urine from flowing back into your bladder. · Check often to see that urine is flowing through the catheter into the drainage bag. · Empty the drainage bag when it is half full. This will keep it from overflowing or backing up.   · When you empty the drainage bag, do not let the tubing or drain spout touch anything. Be careful with your catheter  · Do not unhook the catheter from the drain tube. That could let germs get into the tube. · Make sure that the catheter tubing does not get twisted or kinked. · Do not tug or pull on the catheter. And make sure that the drainage bag does not drag or pull on the catheter. · Do not put powder or lotion on the skin around the catheter. · Talk with your doctor about your options for sexual intercourse while wearing a catheter. How do you empty a urine drainage bag? If your doctor has asked you to keep a record, write down the amount of urine in the bag before you empty it. Wash your hands before and after you touch the bag. 1. Remove the drain spout from its sleeve at the bottom of the drainage bag.  2. Open the valve on the drain spout. Let the urine flow out into the toilet or a container. Be careful not to let the tubing or drain spout touch anything. 3. After you empty the bag, close the valve. Then put the drain spout back into its sleeve at the bottom of the collection bag. How do you add a bedside bag to a leg bag? Wash your hands before and after you handle the bags. 1. Empty the leg bag attached to the catheter. 2. Put a clean towel under the leg bag.  3. Use an alcohol wipe to clean the tip of the bedside bag. Then connect the bedside bag to the leg bag. How can you clean a bedside drainage bag? Many people clean their bedside bag in the morning if they switch to a leg bag. To clean a bedside drainage ba. Remove the bedside bag from the leg bag.  2. Fill the bag with 2 parts vinegar and 3 parts water. Let it stand for 20 minutes. 3. Empty the bag, and let it air dry. When should you call for help? Call your doctor now or seek immediate medical care if:  · You have symptoms of a urinary infection. These may include:  ? Pain or burning when you urinate.   ? A frequent need to urinate without being able to pass much urine.  ? Pain in the flank, which is just below the rib cage and above the waist on either side of the back. ? Blood in your urine. ? A fever. · Your urine smells bad. · You see large blood clots in your urine. · No urine or very little urine is flowing into the bag for 4 or more hours. Watch closely for changes in your health, and be sure to contact your doctor if:  · The area around the catheter becomes irritated, swollen, red, or tender, or there is pus draining from it. · Urine is leaking from the place where the catheter enters your body. Follow-up care is a key part of your treatment and safety. Be sure to make and go to all appointments, and call your doctor if you are having problems. It's also a good idea to know your test results and keep a list of the medicines you take. Where can you learn more? Go to http://adrián-mark.info/. Enter U010 in the search box to learn more about \"Learning About Urinary Catheter Care to Prevent Infection. \"  Current as of: March 20, 2018  Content Version: 11.9  © 4431-5236 [employer]. Care instructions adapted under license by [employer] (which disclaims liability or warranty for this information). If you have questions about a medical condition or this instruction, always ask your healthcare professional. [place] [address] any warranty or liability for your use of this information.

## 2019-06-05 NOTE — ED NOTES
Zhang changed over to leg bag. I have reviewed discharge instructions with the patient. The patient verbalized understanding. Patient armband removed and shredded. 1 prescription given. All questions answered.   Pt d/c'd to home awake, alert and in NAD

## 2019-06-06 LAB
BACTERIA SPEC CULT: ABNORMAL
SERVICE CMNT-IMP: ABNORMAL

## 2019-06-29 ENCOUNTER — HOSPITAL ENCOUNTER (EMERGENCY)
Age: 74
Discharge: HOME | End: 2019-06-29
Attending: EMERGENCY MEDICINE
Payer: MEDICARE

## 2019-06-29 VITALS
TEMPERATURE: 98.5 F | HEART RATE: 76 BPM | HEIGHT: 72 IN | DIASTOLIC BLOOD PRESSURE: 68 MMHG | RESPIRATION RATE: 18 BRPM | SYSTOLIC BLOOD PRESSURE: 131 MMHG | OXYGEN SATURATION: 98 % | WEIGHT: 287 LBS | BODY MASS INDEX: 38.87 KG/M2

## 2019-06-29 DIAGNOSIS — T83.9XXA PROBLEM WITH FOLEY CATHETER, INITIAL ENCOUNTER (HCC): Primary | ICD-10-CM

## 2019-06-29 PROCEDURE — 99282 EMERGENCY DEPT VISIT SF MDM: CPT

## 2019-06-29 PROCEDURE — 77030029179 HC BAG URIN DRNG SIMS -A

## 2019-06-29 NOTE — ED PROVIDER NOTES
[facility] EMERGENCY DEPT      11:33 AM    Date: 6/29/2019  Patient Name: Lenny Day    History of Presenting Illness     Chief Complaint   Patient presents with    Urinary Catheter Problem       76 y.o. male with a past medical history of BPH with a Zhang catheter presents to the ED  stating he needs a new leg bag. States he got a hole in the leg bag and symptoms just needs a new one today. He is not having any or other complaints. Denies any fever, chills, back pain, abdominal pain, dark urine, or other symptoms at this time. Patient denies any other associated signs or symptoms. Patient denies any other complaints. Nursing notes regarding the HPI and triage nursing notes were reviewed. Prior medical records were reviewed. Current Outpatient Medications   Medication Sig Dispense Refill    levoFLOXacin (LEVAQUIN) 750 mg tablet Take 1 Tab by mouth daily. 1 Tab 0    tamsulosin (FLOMAX) 0.4 mg capsule TAKE 1 CAPSULE BY MOUTH DAILY 30 Cap 2    allopurinol (ZYLOPRIM) 100 mg tablet Take  by mouth daily.  aspirin (ASPIRIN) 325 mg tablet Take 325 mg by mouth daily.  atorvastatin (LIPITOR) 20 mg tablet Take  by mouth daily.  cholecalciferol (VITAMIN D3) 1,000 unit cap Take  by mouth daily.  colchicine 0.6 mg tablet Take 0.6 mg by mouth daily.  brimonidine-dorzolamide, PF, 0.15-2 % drop Apply  to eye.  glyBURIDE (DIABETA) 5 mg tablet Take  by mouth Daily (before breakfast).  latanoprost (XALATAN) 0.005 % ophthalmic solution Administer 1 Drop to both eyes nightly.  lisinopril (PRINIVIL, ZESTRIL) 40 mg tablet Take 40 mg by mouth daily.  acetaminophen (TYLENOL) 325 mg tablet Take 975 mg by mouth three (3) times daily.  calcipotriene (DOVONEX) 0.005 % topical cream Apply  to affected area two (2) times a day.  chlorthalidone (HYGROTEN) 25 mg tablet Take 25 mg by mouth daily.       docusate sodium (COLACE) 100 mg capsule Take 100 mg by mouth two (2) times daily as needed for Constipation.  dorzolamide-timolol (COSOPT) 22.3-6.8 mg/mL ophthalmic solution Administer 1 Drop to both eyes two (2) times a day.  magnesium oxide (MAG-OX) 400 mg tablet Take 420 mg by mouth daily.  NIFEdipine ER (ADALAT CC) 60 mg ER tablet Take 60 mg by mouth daily.  omeprazole (PRILOSEC) 20 mg capsule Take 20 mg by mouth daily.  glyBURIDE (DIABETA) 5 mg tablet Take 5 mg by mouth Daily (before breakfast). Past History     Past Medical History:  BPH     Past Surgical History:  History reviewed. No pertinent surgical history. Family History:  History reviewed. No pertinent family history. Social History:  Social History     Tobacco Use    Smoking status: Never Smoker    Smokeless tobacco: Never Used   Substance Use Topics    Alcohol use: Not Currently    Drug use: Not on file       Allergies:  No Known Allergies    Patient's primary care provider (as noted in EPIC):  Jackie Lynne NP    Review of Systems   Constitutional:  Denies malaise, fever, chills. GI/ABD:  Denies injury, pain, distention, nausea, vomiting, diarrhea. : Needs new leg bag. Denies injury, pain, dysuria or urgency. Back:  Denies injury or pain. Pelvis:  Denies injury or pain. Skin: Denies injury, rash, itching or skin changes. All other systems negative as reviewed. Visit Vitals  /68 (BP 1 Location: Right arm, BP Patient Position: At rest)   Pulse 76   Temp 98.5 °F (36.9 °C)   Resp 18   Ht 6' (1.829 m)   Wt 130.2 kg (287 lb)   SpO2 98%   BMI 38.92 kg/m²       PHYSICAL EXAM:    CONSTITUTIONAL:  Alert, in no apparent distress;  well developed;  well nourished. HEAD:  Normocephalic, atraumatic. EYES:  EOMI. Non-icteric sclera. Normal conjunctiva. ENTM:  Nose:  no rhinorrhea. Throat:  no erythema or exudate, mucous membranes moist.  NECK:  Supple  RESPIRATORY:  Chest clear, equal breath sounds, good air movement.   CARDIOVASCULAR:  Regular rate and rhythm. No murmurs, rubs, or gallops. GI:  Normal bowel sounds, abdomen soft and non-tender. No rebound or guarding. BACK:  Non-tender. No CVAT. : Ziploc bag over top of the leg bag, urine unremarkable. NEURO:  Moves all four extremities, and grossly normal motor exam.  SKIN:  No rashes;  Normal for age. PSYCH:  Alert and normal affect. ED COURSE:      IMPRESSION AND MEDICAL DECISION MAKING:  We will replace the leg bag and have him follow-up with urology. Diagnosis:   1. Problem with Zhang catheter, initial encounter [facility])      Disposition: Discharge    Follow-up Information     Follow up With Specialties Details Why Antoinette Marquez MD Urology In 1 week  [address] EMERGENCY DEPT Emergency Medicine  If symptoms worsen [address] [facility].  [phone]          Patient's Medications   Start Taking    No medications on file   Continue Taking    ACETAMINOPHEN (TYLENOL) 325 MG TABLET    Take 975 mg by mouth three (3) times daily. ALLOPURINOL (ZYLOPRIM) 100 MG TABLET    Take  by mouth daily. ASPIRIN (ASPIRIN) 325 MG TABLET    Take 325 mg by mouth daily. ATORVASTATIN (LIPITOR) 20 MG TABLET    Take  by mouth daily. BRIMONIDINE-DORZOLAMIDE, PF, 0.15-2 % DROP    Apply  to eye. CALCIPOTRIENE (DOVONEX) 0.005 % TOPICAL CREAM    Apply  to affected area two (2) times a day. CHLORTHALIDONE (HYGROTEN) 25 MG TABLET    Take 25 mg by mouth daily. CHOLECALCIFEROL (VITAMIN D3) 1,000 UNIT CAP    Take  by mouth daily. COLCHICINE 0.6 MG TABLET    Take 0.6 mg by mouth daily. DOCUSATE SODIUM (COLACE) 100 MG CAPSULE    Take 100 mg by mouth two (2) times daily as needed for Constipation. DORZOLAMIDE-TIMOLOL (COSOPT) 22.3-6.8 MG/ML OPHTHALMIC SOLUTION    Administer 1 Drop to both eyes two (2) times a day. GLYBURIDE (DIABETA) 5 MG TABLET    Take 5 mg by mouth Daily (before breakfast).     GLYBURIDE (DIABETA) 5 MG TABLET    Take  by mouth Daily (before breakfast). LATANOPROST (XALATAN) 0.005 % OPHTHALMIC SOLUTION    Administer 1 Drop to both eyes nightly. LEVOFLOXACIN (LEVAQUIN) 750 MG TABLET    Take 1 Tab by mouth daily. LISINOPRIL (PRINIVIL, ZESTRIL) 40 MG TABLET    Take 40 mg by mouth daily. MAGNESIUM OXIDE (MAG-OX) 400 MG TABLET    Take 420 mg by mouth daily. NIFEDIPINE ER (ADALAT CC) 60 MG ER TABLET    Take 60 mg by mouth daily. OMEPRAZOLE (PRILOSEC) 20 MG CAPSULE    Take 20 mg by mouth daily.     TAMSULOSIN (FLOMAX) 0.4 MG CAPSULE    TAKE 1 CAPSULE BY MOUTH DAILY   These Medications have changed    No medications on file   Stop Taking    No medications on file     ELVA Wright

## 2019-06-29 NOTE — ED NOTES
Written discharge instructions given to patient. Discharge home ambulatory with walker in no distress.

## 2019-07-19 ENCOUNTER — HOSPITAL ENCOUNTER (EMERGENCY)
Age: 74
Discharge: HOME | End: 2019-07-19
Attending: EMERGENCY MEDICINE
Payer: MEDICARE

## 2019-07-19 VITALS
DIASTOLIC BLOOD PRESSURE: 77 MMHG | RESPIRATION RATE: 16 BRPM | BODY MASS INDEX: 37.22 KG/M2 | WEIGHT: 290 LBS | TEMPERATURE: 98.1 F | HEART RATE: 81 BPM | SYSTOLIC BLOOD PRESSURE: 148 MMHG | OXYGEN SATURATION: 99 % | HEIGHT: 74 IN

## 2019-07-19 DIAGNOSIS — T83.9XXA PROBLEM WITH FOLEY CATHETER, INITIAL ENCOUNTER (HCC): Primary | ICD-10-CM

## 2019-07-19 PROCEDURE — 74011000250 HC RX REV CODE- 250: Performed by: EMERGENCY MEDICINE

## 2019-07-19 PROCEDURE — 77030005530 HC CATH URETH FOL40 BARD -B

## 2019-07-19 PROCEDURE — 99284 EMERGENCY DEPT VISIT MOD MDM: CPT

## 2019-07-19 PROCEDURE — 77030029179 HC BAG URIN DRNG SIMS -A

## 2019-07-19 RX ORDER — LIDOCAINE HYDROCHLORIDE 20 MG/ML
JELLY TOPICAL ONCE
Status: COMPLETED | OUTPATIENT
Start: 2019-07-19 | End: 2019-07-19

## 2019-07-19 RX ADMIN — LIDOCAINE HYDROCHLORIDE: 20 JELLY TOPICAL at 08:44

## 2019-07-19 NOTE — ED PROVIDER NOTES
EMERGENCY DEPARTMENT HISTORY AND PHYSICAL EXAM    8:32 AM      Date: 7/19/2019  Patient Name: Gerald Brown    History of Presenting Illness     Chief Complaint   Patient presents with    Urinary Catheter Problem         History Provided By: Patient    Chief Complaint: mohamud issue  Duration:  Hours  Timing:  Acute  Location: NA  Quality: Pressure  Severity: Mild  Modifying Factors: none  Associated Symptoms: denies any other associated signs or symptoms      Additional History (Context): Gerald Brown is a 76 y.o. male with BPH who presents with Mohamud catheter problem. Patient had Mohamud placed 2 days ago after he had a TURP and cystoscopy. He states this morning he is noticed decreased urine output and increasing pain. Concerned that maybe it has been dislodged. He denies any blood in his urine since the procedure. No other complaints. PCP: Mor Chahal NP    Current Outpatient Medications   Medication Sig Dispense Refill    levoFLOXacin (LEVAQUIN) 750 mg tablet Take 1 Tab by mouth daily. 1 Tab 0    tamsulosin (FLOMAX) 0.4 mg capsule TAKE 1 CAPSULE BY MOUTH DAILY 30 Cap 2    allopurinol (ZYLOPRIM) 100 mg tablet Take  by mouth daily.  aspirin (ASPIRIN) 325 mg tablet Take 325 mg by mouth daily.  atorvastatin (LIPITOR) 20 mg tablet Take  by mouth daily.  cholecalciferol (VITAMIN D3) 1,000 unit cap Take  by mouth daily.  colchicine 0.6 mg tablet Take 0.6 mg by mouth daily.  brimonidine-dorzolamide, PF, 0.15-2 % drop Apply  to eye.  glyBURIDE (DIABETA) 5 mg tablet Take  by mouth Daily (before breakfast).  latanoprost (XALATAN) 0.005 % ophthalmic solution Administer 1 Drop to both eyes nightly.  lisinopril (PRINIVIL, ZESTRIL) 40 mg tablet Take 40 mg by mouth daily.  acetaminophen (TYLENOL) 325 mg tablet Take 975 mg by mouth three (3) times daily.  calcipotriene (DOVONEX) 0.005 % topical cream Apply  to affected area two (2) times a day.       chlorthalidone (HYGROTEN) 25 mg tablet Take 25 mg by mouth daily.  docusate sodium (COLACE) 100 mg capsule Take 100 mg by mouth two (2) times daily as needed for Constipation.  dorzolamide-timolol (COSOPT) 22.3-6.8 mg/mL ophthalmic solution Administer 1 Drop to both eyes two (2) times a day.  magnesium oxide (MAG-OX) 400 mg tablet Take 420 mg by mouth daily.  NIFEdipine ER (ADALAT CC) 60 mg ER tablet Take 60 mg by mouth daily.  omeprazole (PRILOSEC) 20 mg capsule Take 20 mg by mouth daily.  glyBURIDE (DIABETA) 5 mg tablet Take 5 mg by mouth Daily (before breakfast). Past History     Past Medical History:  History reviewed. No pertinent past medical history. Past Surgical History:  History reviewed. No pertinent surgical history. Family History:  History reviewed. No pertinent family history. Social History:  Social History     Tobacco Use    Smoking status: Never Smoker    Smokeless tobacco: Never Used   Substance Use Topics    Alcohol use: Not Currently    Drug use: Not on file       Allergies:  No Known Allergies      Review of Systems     Review of Systems   Constitutional: Negative for fever. Gastrointestinal: Negative for vomiting. Genitourinary: Positive for decreased urine volume and difficulty urinating. All other systems reviewed and are negative. Physical Exam     Visit Vitals  /73   Pulse 90   Temp 98.2 °F (36.8 °C)   Resp 16   Ht 6' 2\" (1.88 m)   Wt 131.5 kg (290 lb)   SpO2 97%   BMI 37.23 kg/m²       Physical Exam   Constitutional: He is oriented to person, place, and time. He appears well-developed and well-nourished. HENT:   Head: Normocephalic and atraumatic. Neck: Neck supple. No JVD present. Genitourinary:   Genitourinary Comments: Zhang in place, no blood at the meatus, mild tenderness suprapelvic   Musculoskeletal: He exhibits no edema. Neurological: He is alert and oriented to person, place, and time. Skin: Skin is warm and dry.  No erythema. Psychiatric: Judgment normal.         Diagnostic Study Results     Labs -  No results found for this or any previous visit (from the past 12 hour(s)). Radiologic Studies -   No orders to display         Medical Decision Making   I am the first provider for this patient. I reviewed the vital signs, available nursing notes, past medical history, past surgical history, family history and social history. Vital Signs-Reviewed the patient's vital signs. Pulse Oximetry Analysis -  97 on room air (Interpretation)nl       Records Reviewed: Nursing Notes and Old Medical Records (Time of Review: 8:32 AM)    ED Course: Progress Notes, Reevaluation, and Consults:      Provider Notes (Medical Decision Making): 29-year-old male presenting with decreased urination and increasing pain. Concern for possible Mohamud dislodgment, so will evaluate this. Doubt need for any imaging or labs at this time. 8:34 AM  Bladder scan showing 950ml, will replace mohamud. 9:16 AM  New Mohamud was placed by RN, was noted to have a clot at the end of the prior Mohamud. Patient is now stable for discharge home. Of note he is now telling me he has had a chronic Mohamud ever since March. Advised to keep his regular scheduled appointment with his urologist.      Diagnosis     Clinical Impression:   1. Problem with Mohamud catheter, initial encounter [facility])        Disposition: discharged    Follow-up Information     Follow up With Specialties Details Why Yeimi Mcmahon MD Urology Go to  [address] EMERGENCY DEPT Emergency Medicine  As needed, If symptoms worsen [address] [facility]. [phone]           Patient's Medications   Start Taking    No medications on file   Continue Taking    ACETAMINOPHEN (TYLENOL) 325 MG TABLET    Take 975 mg by mouth three (3) times daily.     ALLOPURINOL (ZYLOPRIM) 100 MG TABLET    Take  by mouth daily.    ASPIRIN (ASPIRIN) 325 MG TABLET    Take 325 mg by mouth daily. ATORVASTATIN (LIPITOR) 20 MG TABLET    Take  by mouth daily. BRIMONIDINE-DORZOLAMIDE, PF, 0.15-2 % DROP    Apply  to eye. CALCIPOTRIENE (DOVONEX) 0.005 % TOPICAL CREAM    Apply  to affected area two (2) times a day. CHLORTHALIDONE (HYGROTEN) 25 MG TABLET    Take 25 mg by mouth daily. CHOLECALCIFEROL (VITAMIN D3) 1,000 UNIT CAP    Take  by mouth daily. COLCHICINE 0.6 MG TABLET    Take 0.6 mg by mouth daily. DOCUSATE SODIUM (COLACE) 100 MG CAPSULE    Take 100 mg by mouth two (2) times daily as needed for Constipation. DORZOLAMIDE-TIMOLOL (COSOPT) 22.3-6.8 MG/ML OPHTHALMIC SOLUTION    Administer 1 Drop to both eyes two (2) times a day. GLYBURIDE (DIABETA) 5 MG TABLET    Take 5 mg by mouth Daily (before breakfast). GLYBURIDE (DIABETA) 5 MG TABLET    Take  by mouth Daily (before breakfast). LATANOPROST (XALATAN) 0.005 % OPHTHALMIC SOLUTION    Administer 1 Drop to both eyes nightly. LEVOFLOXACIN (LEVAQUIN) 750 MG TABLET    Take 1 Tab by mouth daily. LISINOPRIL (PRINIVIL, ZESTRIL) 40 MG TABLET    Take 40 mg by mouth daily. MAGNESIUM OXIDE (MAG-OX) 400 MG TABLET    Take 420 mg by mouth daily. NIFEDIPINE ER (ADALAT CC) 60 MG ER TABLET    Take 60 mg by mouth daily. OMEPRAZOLE (PRILOSEC) 20 MG CAPSULE    Take 20 mg by mouth daily.     TAMSULOSIN (FLOMAX) 0.4 MG CAPSULE    TAKE 1 CAPSULE BY MOUTH DAILY   These Medications have changed    No medications on file   Stop Taking    No medications on file     _______________________________

## 2019-07-19 NOTE — ED NOTES
Patient's mohamud changed to leg bag, patient given instructions on how to change leg bag to mohamud bag, patient voices understanding

## 2019-07-19 NOTE — ED NOTES
Patient coude (16F) removed, blood clot noted at end of catheter, Catheter replaced with (16F) coude, patient with clear radha urine with small clots noted at this time

## 2020-02-01 ENCOUNTER — HOSPITAL ENCOUNTER (EMERGENCY)
Age: 75
Discharge: HOME | End: 2020-02-01
Attending: EMERGENCY MEDICINE
Payer: MEDICARE

## 2020-02-01 VITALS
SYSTOLIC BLOOD PRESSURE: 152 MMHG | HEART RATE: 78 BPM | OXYGEN SATURATION: 99 % | DIASTOLIC BLOOD PRESSURE: 88 MMHG | RESPIRATION RATE: 20 BRPM | TEMPERATURE: 98.1 F

## 2020-02-01 DIAGNOSIS — N30.90 CATHETER CYSTITIS, INITIAL ENCOUNTER (HCC): Primary | ICD-10-CM

## 2020-02-01 DIAGNOSIS — T83.518A CATHETER CYSTITIS, INITIAL ENCOUNTER (HCC): Primary | ICD-10-CM

## 2020-02-01 DIAGNOSIS — N13.9 OBSTRUCTIVE UROPATHY: ICD-10-CM

## 2020-02-01 LAB
ALBUMIN SERPL-MCNC: 3 G/DL (ref 3.4–5)
ALBUMIN/GLOB SERPL: 0.7 {RATIO} (ref 0.8–1.7)
ALP SERPL-CCNC: 128 U/L (ref 45–117)
ALT SERPL-CCNC: 21 U/L (ref 16–61)
ANION GAP SERPL CALC-SCNC: 5 MMOL/L (ref 3–18)
APPEARANCE UR: CLEAR
AST SERPL-CCNC: 17 U/L (ref 10–38)
BACTERIA URNS QL MICRO: ABNORMAL /HPF
BASOPHILS # BLD: 0 K/UL (ref 0–0.1)
BASOPHILS NFR BLD: 0 % (ref 0–2)
BILIRUB SERPL-MCNC: 0.2 MG/DL (ref 0.2–1)
BILIRUB UR QL: NEGATIVE
BUN SERPL-MCNC: 28 MG/DL (ref 7–18)
BUN/CREAT SERPL: 17 (ref 12–20)
CALCIUM SERPL-MCNC: 8.4 MG/DL (ref 8.5–10.1)
CHLORIDE SERPL-SCNC: 111 MMOL/L (ref 100–111)
CO2 SERPL-SCNC: 23 MMOL/L (ref 21–32)
COLOR UR: YELLOW
CREAT SERPL-MCNC: 1.65 MG/DL (ref 0.6–1.3)
DIFFERENTIAL METHOD BLD: ABNORMAL
EOSINOPHIL # BLD: 0.3 K/UL (ref 0–0.4)
EOSINOPHIL NFR BLD: 4 % (ref 0–5)
EPITH CASTS URNS QL MICRO: ABNORMAL /LPF (ref 0–5)
ERYTHROCYTE [DISTWIDTH] IN BLOOD BY AUTOMATED COUNT: 14.1 % (ref 11.6–14.5)
GLOBULIN SER CALC-MCNC: 4.1 G/DL (ref 2–4)
GLUCOSE SERPL-MCNC: 107 MG/DL (ref 74–99)
GLUCOSE UR STRIP.AUTO-MCNC: NEGATIVE MG/DL
HCT VFR BLD AUTO: 38.5 % (ref 36–48)
HGB BLD-MCNC: 12.4 G/DL (ref 13–16)
HGB UR QL STRIP: ABNORMAL
KETONES UR QL STRIP.AUTO: NEGATIVE MG/DL
LEUKOCYTE ESTERASE UR QL STRIP.AUTO: ABNORMAL
LYMPHOCYTES # BLD: 2.2 K/UL (ref 0.9–3.6)
LYMPHOCYTES NFR BLD: 25 % (ref 21–52)
MCH RBC QN AUTO: 26.1 PG (ref 24–34)
MCHC RBC AUTO-ENTMCNC: 32.2 G/DL (ref 31–37)
MCV RBC AUTO: 80.9 FL (ref 74–97)
MONOCYTES # BLD: 0.5 K/UL (ref 0.05–1.2)
MONOCYTES NFR BLD: 6 % (ref 3–10)
MUCOUS THREADS URNS QL MICRO: ABNORMAL /LPF
NEUTS SEG # BLD: 5.7 K/UL (ref 1.8–8)
NEUTS SEG NFR BLD: 65 % (ref 40–73)
NITRITE UR QL STRIP.AUTO: NEGATIVE
PH UR STRIP: 5.5 [PH] (ref 5–8)
PLATELET # BLD AUTO: 198 K/UL (ref 135–420)
PMV BLD AUTO: 10.4 FL (ref 9.2–11.8)
POTASSIUM SERPL-SCNC: 5.1 MMOL/L (ref 3.5–5.5)
PROT SERPL-MCNC: 7.1 G/DL (ref 6.4–8.2)
PROT UR STRIP-MCNC: 100 MG/DL
RBC # BLD AUTO: 4.76 M/UL (ref 4.7–5.5)
RBC #/AREA URNS HPF: ABNORMAL /HPF (ref 0–5)
SODIUM SERPL-SCNC: 139 MMOL/L (ref 136–145)
SP GR UR REFRACTOMETRY: 1.01 (ref 1–1.03)
UROBILINOGEN UR QL STRIP.AUTO: 0.2 EU/DL (ref 0.2–1)
WBC # BLD AUTO: 8.7 K/UL (ref 4.6–13.2)
WBC URNS QL MICRO: ABNORMAL /HPF (ref 0–4)

## 2020-02-01 PROCEDURE — 99285 EMERGENCY DEPT VISIT HI MDM: CPT

## 2020-02-01 PROCEDURE — 80053 COMPREHEN METABOLIC PANEL: CPT

## 2020-02-01 PROCEDURE — 74011250637 HC RX REV CODE- 250/637: Performed by: NURSE PRACTITIONER

## 2020-02-01 PROCEDURE — 51702 INSERT TEMP BLADDER CATH: CPT

## 2020-02-01 PROCEDURE — 77030040830 HC CATH URETH FOL MDII -A

## 2020-02-01 PROCEDURE — 81001 URINALYSIS AUTO W/SCOPE: CPT

## 2020-02-01 PROCEDURE — 87186 SC STD MICRODIL/AGAR DIL: CPT

## 2020-02-01 PROCEDURE — 85025 COMPLETE CBC W/AUTO DIFF WBC: CPT

## 2020-02-01 PROCEDURE — 87077 CULTURE AEROBIC IDENTIFY: CPT

## 2020-02-01 PROCEDURE — 87086 URINE CULTURE/COLONY COUNT: CPT

## 2020-02-01 RX ORDER — CEPHALEXIN 250 MG/1
500 CAPSULE ORAL
Status: COMPLETED | OUTPATIENT
Start: 2020-02-01 | End: 2020-02-01

## 2020-02-01 RX ORDER — CEPHALEXIN 500 MG/1
500 CAPSULE ORAL 2 TIMES DAILY
Qty: 20 CAP | Refills: 0 | Status: SHIPPED | OUTPATIENT
Start: 2020-02-01 | End: 2020-02-11

## 2020-02-01 RX ADMIN — CEPHALEXIN 500 MG: 250 CAPSULE ORAL at 20:31

## 2020-02-01 NOTE — ED PROVIDER NOTES
EMERGENCY DEPARTMENT HISTORY AND PHYSICAL EXAM    5:34 PM      Date: 2/1/2020  Patient Name: Georgette Mejias    History of Presenting Illness     Chief Complaint   Patient presents with    Blood in Urine         History Provided By: Patient    Additional History (Context): Georgette Mejias is a 76 y.o. male with Obesity, BPH with obstruction, chronic indwelling Zhang catheter, nodular prostate with negative biopsy September 2019, diabetes with neuropathy, hypertension who presents with blood in the urine and suprapubic pain since this afternoon. Patient reports a dull suprapubic pain with decreased output from his Zhang catheter over the last week waxing and waning. Today, when home health came to replace his Zhang catheter (as per monthly protocol) he had significant pain with hematuria following the placement. He is still having suprapubic pain which is constant and no output from the Zhang bag. He denies any fever or chills, abdominal pain, vomiting, nausea, diarrhea, or constipation. PCP: Israel Singh NP    Current Outpatient Medications   Medication Sig Dispense Refill    cephALEXin (KEFLEX) 500 mg capsule Take 1 Cap by mouth two (2) times a day for 10 days. 20 Cap 0    tamsulosin (FLOMAX) 0.4 mg capsule TAKE 1 CAPSULE BY MOUTH DAILY 90 Cap 3    allopurinol (ZYLOPRIM) 100 mg tablet Take  by mouth daily.  aspirin (ASPIRIN) 325 mg tablet Take 325 mg by mouth daily.  atorvastatin (LIPITOR) 20 mg tablet Take  by mouth daily.  cholecalciferol (VITAMIN D3) 1,000 unit cap Take  by mouth daily.  colchicine 0.6 mg tablet Take 0.6 mg by mouth daily.  brimonidine-dorzolamide, PF, 0.15-2 % drop Apply  to eye.  glyBURIDE (DIABETA) 5 mg tablet Take  by mouth Daily (before breakfast).  latanoprost (XALATAN) 0.005 % ophthalmic solution Administer 1 Drop to both eyes nightly.  lisinopril (PRINIVIL, ZESTRIL) 40 mg tablet Take 40 mg by mouth daily.       acetaminophen (TYLENOL) 325 mg tablet Take 975 mg by mouth three (3) times daily.  calcipotriene (DOVONEX) 0.005 % topical cream Apply  to affected area two (2) times a day.  chlorthalidone (HYGROTEN) 25 mg tablet Take 25 mg by mouth daily.  docusate sodium (COLACE) 100 mg capsule Take 100 mg by mouth two (2) times daily as needed for Constipation.  dorzolamide-timolol (COSOPT) 22.3-6.8 mg/mL ophthalmic solution Administer 1 Drop to both eyes two (2) times a day.  magnesium oxide (MAG-OX) 400 mg tablet Take 420 mg by mouth daily.  NIFEdipine ER (ADALAT CC) 60 mg ER tablet Take 60 mg by mouth daily.  omeprazole (PRILOSEC) 20 mg capsule Take 20 mg by mouth daily.  glyBURIDE (DIABETA) 5 mg tablet Take 5 mg by mouth Daily (before breakfast). Past History     Past Medical History:  Past Medical History:   Diagnosis Date    Diabetes ([facility] [address]     Gout     Hypercholesterolemia     Hypertension        Past Surgical History:  No past surgical history on file. Family History:  No family history on file. Social History:  Social History     Tobacco Use    Smoking status: Never Smoker    Smokeless tobacco: Never Used   Substance Use Topics    Alcohol use: Not Currently    Drug use: Not on file       Allergies:  No Known Allergies      Review of Systems       Review of Systems   Constitutional: Negative. Negative for chills and fever. HENT: Negative. Negative for congestion, ear pain and rhinorrhea. Eyes: Negative. Negative for pain and redness. Respiratory: Negative. Negative for cough and shortness of breath. Cardiovascular: Negative. Negative for chest pain, palpitations and leg swelling. Gastrointestinal: Positive for abdominal pain (Suprapubic). Negative for constipation, diarrhea, nausea and vomiting. Genitourinary: Positive for hematuria. Negative for dysuria, frequency and urgency. Musculoskeletal: Negative.   Negative for back pain, gait problem, joint swelling and neck pain.   Skin: Negative. Negative for rash and wound. Neurological: Negative. Negative for dizziness, seizures, speech difficulty, weakness, light-headedness and headaches. Hematological: Negative for adenopathy. Does not bruise/bleed easily. All other systems reviewed and are negative. Physical Exam     Visit Vitals  /80   Pulse 84   Temp 97.6 °F (36.4 °C)   Resp 20   SpO2 98%         Physical Exam  Vitals signs and nursing note reviewed. Constitutional:       General: He is not in acute distress. Appearance: Normal appearance. He is obese. He is not ill-appearing, toxic-appearing or diaphoretic. HENT:      Head: Normocephalic and atraumatic. Eyes:      General: Vision grossly intact. Gaze aligned appropriately. Right eye: No discharge. Left eye: No discharge. Conjunctiva/sclera: Conjunctivae normal.      Pupils: Pupils are equal, round, and reactive to light. Neck:      Musculoskeletal: Full passive range of motion without pain, normal range of motion and neck supple. Cardiovascular:      Rate and Rhythm: Normal rate and regular rhythm. Pulmonary:      Effort: Pulmonary effort is normal. No respiratory distress. Breath sounds: Normal breath sounds. No wheezing or rhonchi. Chest:      Chest wall: No tenderness. Abdominal:      General: Abdomen is flat. Palpations: Abdomen is soft. Tenderness: There is abdominal tenderness in the suprapubic area. There is no right CVA tenderness, left CVA tenderness, guarding or rebound. Genitourinary:     Penis: Normal.       Comments: Indwelling Zhang catheter present  Musculoskeletal: Normal range of motion. Skin:     General: Skin is warm and dry. Capillary Refill: Capillary refill takes less than 2 seconds. Neurological:      General: No focal deficit present. Mental Status: He is alert and oriented to person, place, and time.            Diagnostic Study Results     Labs -  Recent Results (from the past 12 hour(s))   URINALYSIS W/ RFLX MICROSCOPIC    Collection Time: 02/01/20  5:54 PM   Result Value Ref Range    Color YELLOW      Appearance CLEAR      Specific gravity 1.012 1.005 - 1.030      pH (UA) 5.5 5.0 - 8.0      Protein 100 (A) NEG mg/dL    Glucose NEGATIVE  NEG mg/dL    Ketone NEGATIVE  NEG mg/dL    Bilirubin NEGATIVE  NEG      Blood LARGE (A) NEG      Urobilinogen 0.2 0.2 - 1.0 EU/dL    Nitrites NEGATIVE  NEG      Leukocyte Esterase TRACE (A) NEG     URINE MICROSCOPIC ONLY    Collection Time: 02/01/20  5:54 PM   Result Value Ref Range    WBC 0 to 3 0 - 4 /hpf    RBC 36 to 50 0 - 5 /hpf    Epithelial cells FEW 0 - 5 /lpf    Bacteria FEW (A) NEG /hpf    Mucus 1+ (A) NEG /lpf   CBC WITH AUTOMATED DIFF    Collection Time: 02/01/20  6:21 PM   Result Value Ref Range    WBC 8.7 4.6 - 13.2 K/uL    RBC 4.76 4.70 - 5.50 M/uL    HGB 12.4 (L) 13.0 - 16.0 g/dL    HCT 38.5 36.0 - 48.0 %    MCV 80.9 74.0 - 97.0 FL    MCH 26.1 24.0 - 34.0 PG    MCHC 32.2 31.0 - 37.0 g/dL    RDW 14.1 11.6 - 14.5 %    PLATELET 567 960 - 124 K/uL    MPV 10.4 9.2 - 11.8 FL    NEUTROPHILS 65 40 - 73 %    LYMPHOCYTES 25 21 - 52 %    MONOCYTES 6 3 - 10 %    EOSINOPHILS 4 0 - 5 %    BASOPHILS 0 0 - 2 %    ABS. NEUTROPHILS 5.7 1.8 - 8.0 K/UL    ABS. LYMPHOCYTES 2.2 0.9 - 3.6 K/UL    ABS. MONOCYTES 0.5 0.05 - 1.2 K/UL    ABS. EOSINOPHILS 0.3 0.0 - 0.4 K/UL    ABS.  BASOPHILS 0.0 0.0 - 0.1 K/UL    DF AUTOMATED     METABOLIC PANEL, COMPREHENSIVE    Collection Time: 02/01/20  6:21 PM   Result Value Ref Range    Sodium 139 136 - 145 mmol/L    Potassium 5.1 3.5 - 5.5 mmol/L    Chloride 111 100 - 111 mmol/L    CO2 23 21 - 32 mmol/L    Anion gap 5 3.0 - 18 mmol/L    Glucose 107 (H) 74 - 99 mg/dL    BUN 28 (H) 7.0 - 18 MG/DL    Creatinine 1.65 (H) 0.6 - 1.3 MG/DL    BUN/Creatinine ratio 17 12 - 20      GFR est AA 50 (L) >60 ml/min/1.73m2    GFR est non-AA 41 (L) >60 ml/min/1.73m2    Calcium 8.4 (L) 8.5 - 10.1 MG/DL    Bilirubin, total 0.2 0.2 - 1.0 MG/DL    ALT (SGPT) 21 16 - 61 U/L    AST (SGOT) 17 10 - 38 U/L    Alk. phosphatase 128 (H) 45 - 117 U/L    Protein, total 7.1 6.4 - 8.2 g/dL    Albumin 3.0 (L) 3.4 - 5.0 g/dL    Globulin 4.1 (H) 2.0 - 4.0 g/dL    A-G Ratio 0.7 (L) 0.8 - 1.7         Radiologic Studies -   No orders to display         Medical Decision Making   I am the first provider for this patient. I reviewed the vital signs, available nursing notes, past medical history, past surgical history, family history and social history. Vital Signs-Reviewed the patient's vital signs. Records Reviewed: Nursing Notes and Old Medical Records (Time of Review: 5:34 PM)    ED Course: Progress Notes, Reevaluation, and Consults:       Provider Notes (Medical Decision Making): This is a 71-year-old male with a complex history of chronic indwelling Zhang catheter due to BPH with obstruction and a nodular prostate. During his routine Zhang catheter changed by home health there was an issue with placement and where the patient had increased pain and was not passing urine through his catheter. Upon arrival to the ER it was noted there was less than 50 cc of urine in his leg bag clear, red urine. Bladder scanner showed greater than 250 mL's of urine in the bladder. Zhang catheter ([address]) irrigation was attempted, with no output. Decision to change the Zhang catheter to a larger [address] was made. Nursing was able to easily pass a [address] coudé catheter with clear yellow output of urine initial about 500 mL's. Patient's suprapubic pain improved at this time. There was noted to be a bright red blood at the urinary meatus, but none in the urine. CBC shows no leukocytosis or anemia. CMP shows improving renal function. Patient has been afebrile nontoxic while in ER. His pain has resolved. Due to his complex history and diabetes he will be started on antibiotics for catheter related cystitis. Urine culture is pending.   He is instructed to follow-up with his urologist early next week. He was given ER return precautions including abdominal pain, fever, inability to pass urine, or any new/worsening/alarming concerns. Diagnosis     Clinical Impression:   1. Catheter cystitis, initial encounter ([address]    2. Obstructive uropathy        Disposition: home     Follow-up Information     Follow up With Specialties Details Why Carol Marie MD Urology Schedule an appointment as soon as possible for a visit Follow-up from the Emergency Department [facility] [address] [facility] EMERGENCY DEPT Emergency Medicine  As needed, If symptoms worsen [address] [facility]  [phone]           Patient's Medications   Start Taking    CEPHALEXIN (KEFLEX) 500 MG CAPSULE    Take 1 Cap by mouth two (2) times a day for 10 days. Continue Taking    ACETAMINOPHEN (TYLENOL) 325 MG TABLET    Take 975 mg by mouth three (3) times daily. ALLOPURINOL (ZYLOPRIM) 100 MG TABLET    Take  by mouth daily. ASPIRIN (ASPIRIN) 325 MG TABLET    Take 325 mg by mouth daily. ATORVASTATIN (LIPITOR) 20 MG TABLET    Take  by mouth daily. BRIMONIDINE-DORZOLAMIDE, PF, 0.15-2 % DROP    Apply  to eye. CALCIPOTRIENE (DOVONEX) 0.005 % TOPICAL CREAM    Apply  to affected area two (2) times a day. CHLORTHALIDONE (HYGROTEN) 25 MG TABLET    Take 25 mg by mouth daily. CHOLECALCIFEROL (VITAMIN D3) 1,000 UNIT CAP    Take  by mouth daily. COLCHICINE 0.6 MG TABLET    Take 0.6 mg by mouth daily. DOCUSATE SODIUM (COLACE) 100 MG CAPSULE    Take 100 mg by mouth two (2) times daily as needed for Constipation. DORZOLAMIDE-TIMOLOL (COSOPT) 22.3-6.8 MG/ML OPHTHALMIC SOLUTION    Administer 1 Drop to both eyes two (2) times a day. GLYBURIDE (DIABETA) 5 MG TABLET    Take 5 mg by mouth Daily (before breakfast). GLYBURIDE (DIABETA) 5 MG TABLET    Take  by mouth Daily (before breakfast).     LATANOPROST (XALATAN) 0.005 % OPHTHALMIC SOLUTION    Administer 1 Drop to both eyes nightly. LISINOPRIL (PRINIVIL, ZESTRIL) 40 MG TABLET    Take 40 mg by mouth daily. MAGNESIUM OXIDE (MAG-OX) 400 MG TABLET    Take 420 mg by mouth daily. NIFEDIPINE ER (ADALAT CC) 60 MG ER TABLET    Take 60 mg by mouth daily. OMEPRAZOLE (PRILOSEC) 20 MG CAPSULE    Take 20 mg by mouth daily. TAMSULOSIN (FLOMAX) 0.4 MG CAPSULE    TAKE 1 CAPSULE BY MOUTH DAILY   These Medications have changed    No medications on file   Stop Taking    No medications on file       Dictation disclaimer:  Please note that this dictation was completed with [employer], the [employer] voice recognition software. Quite often unanticipated grammatical, syntax, homophones, and other interpretive errors are inadvertently transcribed by the computer software. Please disregard these errors. Please excuse any errors that have escaped final proofreading.

## 2020-02-02 NOTE — ED NOTES
Verbal shift change report given to Char Hernandez (oncoming nurse) by Prema Davis (offgoing nurse). Report included the following information SBAR, ED Summary and MAR.

## 2020-02-03 LAB
BACTERIA SPEC CULT: ABNORMAL
SERVICE CMNT-IMP: ABNORMAL

## 2020-03-29 ENCOUNTER — HOSPITAL ENCOUNTER (EMERGENCY)
Age: 75
Discharge: HOME | End: 2020-03-29
Attending: EMERGENCY MEDICINE
Payer: MEDICARE

## 2020-03-29 VITALS
WEIGHT: 289 LBS | HEIGHT: 72 IN | SYSTOLIC BLOOD PRESSURE: 178 MMHG | HEART RATE: 78 BPM | DIASTOLIC BLOOD PRESSURE: 66 MMHG | RESPIRATION RATE: 18 BRPM | TEMPERATURE: 98 F | BODY MASS INDEX: 39.14 KG/M2 | OXYGEN SATURATION: 100 %

## 2020-03-29 DIAGNOSIS — N39.0 URINARY TRACT INFECTION ASSOCIATED WITH CATHETERIZATION OF URINARY TRACT, UNSPECIFIED INDWELLING URINARY CATHETER TYPE, INITIAL ENCOUNTER (HCC): ICD-10-CM

## 2020-03-29 DIAGNOSIS — T83.511A URINARY TRACT INFECTION ASSOCIATED WITH CATHETERIZATION OF URINARY TRACT, UNSPECIFIED INDWELLING URINARY CATHETER TYPE, INITIAL ENCOUNTER (HCC): ICD-10-CM

## 2020-03-29 DIAGNOSIS — T83.011A MALFUNCTION OF FOLEY CATHETER, INITIAL ENCOUNTER (HCC): Primary | ICD-10-CM

## 2020-03-29 LAB
APPEARANCE UR: ABNORMAL
BACTERIA URNS QL MICRO: ABNORMAL /HPF
BILIRUB UR QL: NEGATIVE
COLOR UR: YELLOW
EPITH CASTS URNS QL MICRO: ABNORMAL /LPF (ref 0–5)
GLUCOSE UR STRIP.AUTO-MCNC: NEGATIVE MG/DL
HGB UR QL STRIP: ABNORMAL
KETONES UR QL STRIP.AUTO: NEGATIVE MG/DL
LEUKOCYTE ESTERASE UR QL STRIP.AUTO: ABNORMAL
NITRITE UR QL STRIP.AUTO: NEGATIVE
PH UR STRIP: 6.5 [PH] (ref 5–8)
PROT UR STRIP-MCNC: 100 MG/DL
RBC #/AREA URNS HPF: ABNORMAL /HPF (ref 0–5)
SP GR UR REFRACTOMETRY: 1.01 (ref 1–1.03)
UROBILINOGEN UR QL STRIP.AUTO: 0.2 EU/DL (ref 0.2–1)
WBC URNS QL MICRO: ABNORMAL /HPF (ref 0–4)

## 2020-03-29 PROCEDURE — 87077 CULTURE AEROBIC IDENTIFY: CPT

## 2020-03-29 PROCEDURE — 87186 SC STD MICRODIL/AGAR DIL: CPT

## 2020-03-29 PROCEDURE — 87086 URINE CULTURE/COLONY COUNT: CPT

## 2020-03-29 PROCEDURE — 81001 URINALYSIS AUTO W/SCOPE: CPT

## 2020-03-29 PROCEDURE — 74011250637 HC RX REV CODE- 250/637: Performed by: EMERGENCY MEDICINE

## 2020-03-29 PROCEDURE — 99285 EMERGENCY DEPT VISIT HI MDM: CPT

## 2020-03-29 RX ORDER — CEPHALEXIN 500 MG/1
500 CAPSULE ORAL 4 TIMES DAILY
Qty: 28 CAP | Refills: 0 | Status: SHIPPED | OUTPATIENT
Start: 2020-03-29 | End: 2020-04-05

## 2020-03-29 RX ORDER — PHENAZOPYRIDINE HYDROCHLORIDE 100 MG/1
200 TABLET, FILM COATED ORAL ONCE
Status: COMPLETED | OUTPATIENT
Start: 2020-03-29 | End: 2020-03-29

## 2020-03-29 RX ORDER — CEPHALEXIN 250 MG/1
500 CAPSULE ORAL
Status: COMPLETED | OUTPATIENT
Start: 2020-03-29 | End: 2020-03-29

## 2020-03-29 RX ADMIN — CEPHALEXIN 500 MG: 250 CAPSULE ORAL at 22:47

## 2020-03-29 RX ADMIN — PHENAZOPYRIDINE 200 MG: 100 TABLET ORAL at 22:47

## 2020-03-30 NOTE — ED PROVIDER NOTES
EMERGENCY DEPARTMENT HISTORY AND PHYSICAL EXAM    9:05 PM      Date: 3/29/2020  Patient Name: Cecy Rangel    History of Presenting Illness     Chief Complaint   Patient presents with    Urinary Catheter Problem         History Provided By: Patient    Additional History (Context): Cecy Rangel is a 76 y.o. male with diabetes, hypertension, hyperlipidemia and obstructive uropathy s/p mohamud cath insertion who presents with complaints of mohamud catheter pain and difficulty with drainage from mohamud catheter. He states that he had his mohamud flushed today and since then he has had suprapubic pain and slow drainage from the catheter. He denies any fevers or chills, denies any hematuria since the mohamud flush. PCP: Tera Hutchinson NP    Current Outpatient Medications   Medication Sig Dispense Refill    cephALEXin (Keflex) 500 mg capsule Take 1 Cap by mouth four (4) times daily for 7 days. 28 Cap 0    tamsulosin (FLOMAX) 0.4 mg capsule TAKE 1 CAPSULE BY MOUTH DAILY 90 Cap 3    allopurinol (ZYLOPRIM) 100 mg tablet Take  by mouth daily.  aspirin (ASPIRIN) 325 mg tablet Take 325 mg by mouth daily.  atorvastatin (LIPITOR) 20 mg tablet Take  by mouth daily.  cholecalciferol (VITAMIN D3) 1,000 unit cap Take  by mouth daily.  colchicine 0.6 mg tablet Take 0.6 mg by mouth daily.  brimonidine-dorzolamide, PF, 0.15-2 % drop Apply  to eye.  glyBURIDE (DIABETA) 5 mg tablet Take  by mouth Daily (before breakfast).  latanoprost (XALATAN) 0.005 % ophthalmic solution Administer 1 Drop to both eyes nightly.  lisinopril (PRINIVIL, ZESTRIL) 40 mg tablet Take 40 mg by mouth daily.  acetaminophen (TYLENOL) 325 mg tablet Take 975 mg by mouth three (3) times daily.  calcipotriene (DOVONEX) 0.005 % topical cream Apply  to affected area two (2) times a day.  chlorthalidone (HYGROTEN) 25 mg tablet Take 25 mg by mouth daily.       docusate sodium (COLACE) 100 mg capsule Take 100 mg by mouth two (2) times daily as needed for Constipation.  dorzolamide-timolol (COSOPT) 22.3-6.8 mg/mL ophthalmic solution Administer 1 Drop to both eyes two (2) times a day.  magnesium oxide (MAG-OX) 400 mg tablet Take 420 mg by mouth daily.  NIFEdipine ER (ADALAT CC) 60 mg ER tablet Take 60 mg by mouth daily.  omeprazole (PRILOSEC) 20 mg capsule Take 20 mg by mouth daily.  glyBURIDE (DIABETA) 5 mg tablet Take 5 mg by mouth Daily (before breakfast). Past History     Past Medical History:  Past Medical History:   Diagnosis Date    Diabetes ([facility] [address]     Gout     Hypercholesterolemia     Hypertension        Past Surgical History:  No past surgical history on file. Family History:  No family history on file. Social History:  Social History     Tobacco Use    Smoking status: Never Smoker    Smokeless tobacco: Never Used   Substance Use Topics    Alcohol use: Not Currently    Drug use: Not on file       Allergies:  No Known Allergies      Review of Systems       Review of Systems   Constitutional: Negative. HENT: Negative. Respiratory: Negative. Cardiovascular: Negative. Gastrointestinal: Negative. Genitourinary: Positive for difficulty urinating and dysuria.        +suprapubic pain  + catheter problem   Musculoskeletal: Negative. Skin: Negative. Neurological: Negative. All other systems reviewed and are negative. Physical Exam     Visit Vitals  /89 (BP 1 Location: Right arm, BP Patient Position: At rest)   Pulse 82   Temp 97 °F (36.1 °C)   Resp 18   Ht 6' (1.829 m)   Wt 131.1 kg (289 lb)   SpO2 95%   BMI 39.20 kg/m²         Physical Exam  Vitals signs reviewed. Constitutional:       General: He is not in acute distress. Appearance: Normal appearance. He is obese. He is not ill-appearing, toxic-appearing or diaphoretic. HENT:      Head: Normocephalic and atraumatic.       Right Ear: External ear normal.      Left Ear: External ear normal. Nose: Nose normal.      Mouth/Throat:      Mouth: Mucous membranes are moist.      Pharynx: Oropharynx is clear. Eyes:      Extraocular Movements: Extraocular movements intact. Neck:      Musculoskeletal: Neck supple. Cardiovascular:      Rate and Rhythm: Normal rate and regular rhythm. Pulses: Normal pulses. Heart sounds: No murmur. No gallop. Pulmonary:      Effort: Pulmonary effort is normal.      Breath sounds: Normal breath sounds. No wheezing, rhonchi or rales. Abdominal:      General: Bowel sounds are normal. There is no distension. Palpations: Abdomen is soft. Tenderness: There is abdominal tenderness in the suprapubic area. There is no guarding or rebound. Genitourinary:     Penis: Normal.       Comments: Zhang catheter in place without drainage or discharge from the urethral meatus. Skin:     General: Skin is warm and dry. Capillary Refill: Capillary refill takes less than 2 seconds. Neurological:      General: No focal deficit present. Mental Status: He is alert and oriented to person, place, and time. Cranial Nerves: No cranial nerve deficit.            Diagnostic Study Results     Labs -  Recent Results (from the past 12 hour(s))   URINALYSIS W/ RFLX MICROSCOPIC    Collection Time: 03/29/20  9:34 PM   Result Value Ref Range    Color YELLOW      Appearance HAZY      Specific gravity 1.007 1.005 - 1.030      pH (UA) 6.5 5.0 - 8.0      Protein 100 (A) NEG mg/dL    Glucose NEGATIVE  NEG mg/dL    Ketone NEGATIVE  NEG mg/dL    Bilirubin NEGATIVE  NEG      Blood LARGE (A) NEG      Urobilinogen 0.2 0.2 - 1.0 EU/dL    Nitrites NEGATIVE  NEG      Leukocyte Esterase LARGE (A) NEG     URINE MICROSCOPIC ONLY    Collection Time: 03/29/20  9:34 PM   Result Value Ref Range    WBC 4 to 10 0 - 4 /hpf    RBC TOO NUMEROUS TO COUNT 0 - 5 /hpf    Epithelial cells FEW 0 - 5 /lpf    Bacteria 4+ (A) NEG /hpf       Radiologic Studies -   No orders to display         Medical Decision Making   I am the first provider for this patient. I reviewed the vital signs, available nursing notes, past medical history, past surgical history, family history and social history. Vital Signs-Reviewed the patient's vital signs. Records Reviewed: Nursing Notes and Old Medical Records (Time of Review: 9:05 PM)    ED Course: Progress Notes, Reevaluation, and Consults:  9:05 PM  Met with patient, reviewed history, performed physical exam. Will adjust position of mohamud catheter to attempt to relieve discomfort. Will send urine for urinalysis. 2118 Mohamud catheter repositioned by BLACK Chung with abrupt urine output and relief of suprapubic pain. 2006 UA shows large leukocyte esterase and 4+ bacteria. Will send for culture and start patient on Keflex and send urine for culture. Provider Notes (Medical Decision Making):   76year old male seen in the ED for complaints of suprapubic pain and mohamud catheter complications. Physical exam reveals suprapubic tenderness to palpation. Patient had mohamud catheter deflated and advanced with abrupt return of urine flow and immediate relief of suprapubic pain. Urine was sent for urinalysis which shows large leukocyte esterase and 4+ bacteria. Patient will be started on oral abx. Patient advised to follow up with his PCP as soon as possible for reassessment. Patient advised to follow up with urology as soon as possible for reassessment. Patient advised to return to the ED immediately if symptoms worsen, or as needed. Diagnosis     Clinical Impression:   1. Malfunction of Mohamud catheter, initial encounter ([facility] [address]    2. Urinary tract infection associated with catheterization of urinary tract, unspecified indwelling urinary catheter type, initial encounter [facility])        Disposition: home     Follow-up Information     Follow up With Specialties Details Why Contact Rosalee Villafana NP Nurse Practitioner Call in 1 day For follow up regarding ER visit.  [address] [place] [address]  [phone]      [facility] EMERGENCY DEPT Emergency Medicine  As needed, Immediately if symptoms worsen. Suki Whatley  [phone]           Patient's Medications   Start Taking    CEPHALEXIN (KEFLEX) 500 MG CAPSULE    Take 1 Cap by mouth four (4) times daily for 7 days. Continue Taking    ACETAMINOPHEN (TYLENOL) 325 MG TABLET    Take 975 mg by mouth three (3) times daily. ALLOPURINOL (ZYLOPRIM) 100 MG TABLET    Take  by mouth daily. ASPIRIN (ASPIRIN) 325 MG TABLET    Take 325 mg by mouth daily. ATORVASTATIN (LIPITOR) 20 MG TABLET    Take  by mouth daily. BRIMONIDINE-DORZOLAMIDE, PF, 0.15-2 % DROP    Apply  to eye. CALCIPOTRIENE (DOVONEX) 0.005 % TOPICAL CREAM    Apply  to affected area two (2) times a day. CHLORTHALIDONE (HYGROTEN) 25 MG TABLET    Take 25 mg by mouth daily. CHOLECALCIFEROL (VITAMIN D3) 1,000 UNIT CAP    Take  by mouth daily. COLCHICINE 0.6 MG TABLET    Take 0.6 mg by mouth daily. DOCUSATE SODIUM (COLACE) 100 MG CAPSULE    Take 100 mg by mouth two (2) times daily as needed for Constipation. DORZOLAMIDE-TIMOLOL (COSOPT) 22.3-6.8 MG/ML OPHTHALMIC SOLUTION    Administer 1 Drop to both eyes two (2) times a day. GLYBURIDE (DIABETA) 5 MG TABLET    Take 5 mg by mouth Daily (before breakfast). GLYBURIDE (DIABETA) 5 MG TABLET    Take  by mouth Daily (before breakfast). LATANOPROST (XALATAN) 0.005 % OPHTHALMIC SOLUTION    Administer 1 Drop to both eyes nightly. LISINOPRIL (PRINIVIL, ZESTRIL) 40 MG TABLET    Take 40 mg by mouth daily. MAGNESIUM OXIDE (MAG-OX) 400 MG TABLET    Take 420 mg by mouth daily. NIFEDIPINE ER (ADALAT CC) 60 MG ER TABLET    Take 60 mg by mouth daily. OMEPRAZOLE (PRILOSEC) 20 MG CAPSULE    Take 20 mg by mouth daily.     TAMSULOSIN (FLOMAX) 0.4 MG CAPSULE    TAKE 1 CAPSULE BY MOUTH DAILY   These Medications have changed    No medications on file   Stop Taking    No medications on file     Carine Perea PA-C    Dictation disclaimer:  Please note that this dictation was completed with [employer], the computer voice recognition software. Quite often unanticipated grammatical, syntax, homophones, and other interpretive errors are inadvertently transcribed by the computer software. Please disregard these errors. Please excuse any errors that have escaped final proofreading.

## 2020-03-30 NOTE — ED NOTES
Zhang catheter balloon deflated advanced - urine flow steaming- balloon re inflated. Patient states has complete relief. patient tolerated well

## 2020-03-30 NOTE — DISCHARGE INSTRUCTIONS
Patient Education        Urinary Tract Infections in Men: Care Instructions  Your Care Instructions    A urinary tract infection, or UTI, is a general term for an infection anywhere between the kidneys and the tip of the penis. UTIs can also be a result of a prostate problem. Most cause pain or burning when you urinate. Most UTIs are caused by bacteria and can be cured with antibiotics. It is important to complete your treatment so that the infection does not get worse. Follow-up care is a key part of your treatment and safety. Be sure to make and go to all appointments, and call your doctor if you are having problems. It's also a good idea to know your test results and keep a list of the medicines you take. How can you care for yourself at home? · Take your antibiotics as prescribed. Do not stop taking them just because you feel better. You need to take the full course of antibiotics. · Take your medicines exactly as prescribed. Your doctor may have prescribed a medicine, such as phenazopyridine (Pyridium), to help relieve pain when you urinate. This turns your urine orange. You may stop taking it when your symptoms get better. But be sure to take all of your antibiotics, which treat the infection. · Drink extra water for the next day or two. This will help make the urine less concentrated and help wash out the bacteria causing the infection. (If you have kidney, heart, or liver disease and have to limit your fluids, talk with your doctor before you increase your fluid intake.)  · Avoid drinks that are carbonated or have caffeine. They can irritate the bladder. · Urinate often. Try to empty your bladder each time. · To relieve pain, take a hot bath or lay a heating pad (set on low) over your lower belly or genital area. Never go to sleep with a heating pad in place. To help prevent UTIs  · Drink plenty of fluids, enough so that your urine is light yellow or clear like water.  If you have kidney, heart, or liver disease and have to limit fluids, talk with your doctor before you increase the amount of fluids you drink. · Urinate when you have the urge. Do not hold your urine for a long time. Urinate before you go to sleep. · Keep your penis clean. Catheter care  If you have a drainage tube (catheter) in place, the following steps will help you care for it. · Always wash your hands before and after touching your catheter. · Check the area around the urethra for inflammation or signs of infection. Signs of infection include irritated, swollen, red, or tender skin, or pus around the catheter. · Clean the area around the catheter with soap and water two times a day. Dry with a clean towel afterward. · Do not apply powder or lotion to the skin around the catheter. To empty the urine collection bag  · Wash your hands with soap and water. · Without touching the drain spout, remove the spout from its sleeve at the bottom of the collection bag. Open the valve on the spout. · Let the urine flow out of the bag and into the toilet or a container. Do not let the tubing or drain spout touch anything. · After you empty the bag, clean the end of the drain spout with tissue and water. Close the valve and put the drain spout back into its sleeve at the bottom of the collection bag. · Wash your hands with soap and water. When should you call for help? Call your doctor now or seek immediate medical care if:    · Symptoms such as a fever, chills, nausea, or vomiting get worse or happen for the first time.     · You have new pain in your back just below your rib cage. This is called flank pain.     · There is new blood or pus in your urine.     · You are not able to take or keep down your antibiotics.    Watch closely for changes in your health, and be sure to contact your doctor if:    · You are not getting better after taking an antibiotic for 2 days.     · Your symptoms go away but then come back.    Where can you learn more?  Go to http://adrián-mark.info/  Enter A431 in the search box to learn more about \"Urinary Tract Infections in Men: Care Instructions. \"  Current as of: August 21, 2019Content Version: 12.4  © 1426-2290 [employer]. Care instructions adapted under license by [employer] (which disclaims liability or warranty for this information). If you have questions about a medical condition or this instruction, always ask your healthcare professional. [address] any warranty or liability for your use of this information.

## 2020-03-30 NOTE — ED TRIAGE NOTES
Patient has indwelling mohamud catheter that home Health came today at 1400 irrigated for blockage. Patient states told Nurse that did not feel catheter  was draining.  Patient states 1600 having pain increasing- Home health told to come to ED

## 2020-04-01 LAB
BACTERIA SPEC CULT: ABNORMAL
CC UR VC: ABNORMAL
SERVICE CMNT-IMP: ABNORMAL